# Patient Record
Sex: MALE | Race: WHITE | HISPANIC OR LATINO | Employment: FULL TIME | URBAN - METROPOLITAN AREA
[De-identification: names, ages, dates, MRNs, and addresses within clinical notes are randomized per-mention and may not be internally consistent; named-entity substitution may affect disease eponyms.]

---

## 2024-06-28 ENCOUNTER — NURSE TRIAGE (OUTPATIENT)
Dept: NURSING | Facility: CLINIC | Age: 24
End: 2024-06-28

## 2024-06-28 PROCEDURE — 99285 EMERGENCY DEPT VISIT HI MDM: CPT

## 2024-06-29 ENCOUNTER — HOSPITAL ENCOUNTER (INPATIENT)
Facility: CLINIC | Age: 24
LOS: 3 days | Discharge: HOME | End: 2024-07-03
Attending: EMERGENCY MEDICINE | Admitting: INTERNAL MEDICINE
Payer: COMMERCIAL

## 2024-06-29 ENCOUNTER — APPOINTMENT (OUTPATIENT)
Dept: CT IMAGING | Facility: CLINIC | Age: 24
End: 2024-06-29
Attending: EMERGENCY MEDICINE
Payer: COMMERCIAL

## 2024-06-29 DIAGNOSIS — K57.20 PERFORATED DIVERTICULUM OF LARGE INTESTINE: Primary | ICD-10-CM

## 2024-06-29 DIAGNOSIS — K57.92 DIVERTICULITIS: ICD-10-CM

## 2024-06-29 DIAGNOSIS — R30.0 DYSURIA: ICD-10-CM

## 2024-06-29 DIAGNOSIS — K59.00 CONSTIPATION, UNSPECIFIED CONSTIPATION TYPE: ICD-10-CM

## 2024-06-29 LAB
ALBUMIN SERPL BCG-MCNC: 3.7 G/DL (ref 3.5–5.2)
ALBUMIN UR-MCNC: NEGATIVE MG/DL
ALP SERPL-CCNC: 64 U/L (ref 40–150)
ALT SERPL W P-5'-P-CCNC: 22 U/L (ref 0–70)
ANION GAP SERPL CALCULATED.3IONS-SCNC: 7 MMOL/L (ref 7–15)
ANION GAP SERPL CALCULATED.3IONS-SCNC: 9 MMOL/L (ref 7–15)
APPEARANCE UR: CLEAR
AST SERPL W P-5'-P-CCNC: 17 U/L (ref 0–45)
BASOPHILS # BLD AUTO: 0 10E3/UL (ref 0–0.2)
BASOPHILS # BLD AUTO: 0 10E3/UL (ref 0–0.2)
BASOPHILS NFR BLD AUTO: 0 %
BASOPHILS NFR BLD AUTO: 0 %
BILIRUB SERPL-MCNC: 0.4 MG/DL
BILIRUB UR QL STRIP: NEGATIVE
BUN SERPL-MCNC: 12.4 MG/DL (ref 6–20)
BUN SERPL-MCNC: 9.6 MG/DL (ref 6–20)
C TRACH DNA SPEC QL NAA+PROBE: NEGATIVE
CALCIUM SERPL-MCNC: 8.5 MG/DL (ref 8.6–10)
CALCIUM SERPL-MCNC: 9.1 MG/DL (ref 8.6–10)
CHLORIDE SERPL-SCNC: 103 MMOL/L (ref 98–107)
CHLORIDE SERPL-SCNC: 105 MMOL/L (ref 98–107)
COLOR UR AUTO: ABNORMAL
CREAT SERPL-MCNC: 0.75 MG/DL (ref 0.67–1.17)
CREAT SERPL-MCNC: 0.91 MG/DL (ref 0.67–1.17)
DEPRECATED HCO3 PLAS-SCNC: 26 MMOL/L (ref 22–29)
DEPRECATED HCO3 PLAS-SCNC: 27 MMOL/L (ref 22–29)
EGFRCR SERPLBLD CKD-EPI 2021: >90 ML/MIN/1.73M2
EGFRCR SERPLBLD CKD-EPI 2021: >90 ML/MIN/1.73M2
EOSINOPHIL # BLD AUTO: 0.1 10E3/UL (ref 0–0.7)
EOSINOPHIL # BLD AUTO: 0.2 10E3/UL (ref 0–0.7)
EOSINOPHIL NFR BLD AUTO: 1 %
EOSINOPHIL NFR BLD AUTO: 1 %
ERYTHROCYTE [DISTWIDTH] IN BLOOD BY AUTOMATED COUNT: 13.6 % (ref 10–15)
ERYTHROCYTE [DISTWIDTH] IN BLOOD BY AUTOMATED COUNT: 13.8 % (ref 10–15)
GLUCOSE SERPL-MCNC: 102 MG/DL (ref 70–99)
GLUCOSE SERPL-MCNC: 107 MG/DL (ref 70–99)
GLUCOSE UR STRIP-MCNC: NEGATIVE MG/DL
HCT VFR BLD AUTO: 38.3 % (ref 40–53)
HCT VFR BLD AUTO: 40.4 % (ref 40–53)
HGB BLD-MCNC: 12.4 G/DL (ref 13.3–17.7)
HGB BLD-MCNC: 13 G/DL (ref 13.3–17.7)
HGB UR QL STRIP: ABNORMAL
IMM GRANULOCYTES # BLD: 0.1 10E3/UL
IMM GRANULOCYTES # BLD: 0.1 10E3/UL
IMM GRANULOCYTES NFR BLD: 0 %
IMM GRANULOCYTES NFR BLD: 1 %
KETONES UR STRIP-MCNC: NEGATIVE MG/DL
LACTATE SERPL-SCNC: 0.8 MMOL/L (ref 0.7–2)
LEUKOCYTE ESTERASE UR QL STRIP: NEGATIVE
LYMPHOCYTES # BLD AUTO: 1.8 10E3/UL (ref 0.8–5.3)
LYMPHOCYTES # BLD AUTO: 2.7 10E3/UL (ref 0.8–5.3)
LYMPHOCYTES NFR BLD AUTO: 15 %
LYMPHOCYTES NFR BLD AUTO: 19 %
MCH RBC QN AUTO: 28.7 PG (ref 26.5–33)
MCH RBC QN AUTO: 28.9 PG (ref 26.5–33)
MCHC RBC AUTO-ENTMCNC: 32.2 G/DL (ref 31.5–36.5)
MCHC RBC AUTO-ENTMCNC: 32.4 G/DL (ref 31.5–36.5)
MCV RBC AUTO: 89 FL (ref 78–100)
MCV RBC AUTO: 90 FL (ref 78–100)
MONOCYTES # BLD AUTO: 1.2 10E3/UL (ref 0–1.3)
MONOCYTES # BLD AUTO: 1.2 10E3/UL (ref 0–1.3)
MONOCYTES NFR BLD AUTO: 10 %
MONOCYTES NFR BLD AUTO: 9 %
MUCOUS THREADS #/AREA URNS LPF: PRESENT /LPF
N GONORRHOEA DNA SPEC QL NAA+PROBE: NEGATIVE
NEUTROPHILS # BLD AUTO: 8.4 10E3/UL (ref 1.6–8.3)
NEUTROPHILS # BLD AUTO: 9.7 10E3/UL (ref 1.6–8.3)
NEUTROPHILS NFR BLD AUTO: 70 %
NEUTROPHILS NFR BLD AUTO: 73 %
NITRATE UR QL: NEGATIVE
NRBC # BLD AUTO: 0 10E3/UL
NRBC # BLD AUTO: 0 10E3/UL
NRBC BLD AUTO-RTO: 0 /100
NRBC BLD AUTO-RTO: 0 /100
PH UR STRIP: 6 [PH] (ref 5–7)
PLATELET # BLD AUTO: 209 10E3/UL (ref 150–450)
PLATELET # BLD AUTO: 236 10E3/UL (ref 150–450)
POTASSIUM SERPL-SCNC: 3.8 MMOL/L (ref 3.4–5.3)
POTASSIUM SERPL-SCNC: 3.9 MMOL/L (ref 3.4–5.3)
PROT SERPL-MCNC: 7 G/DL (ref 6.4–8.3)
RADIOLOGIST FLAGS: ABNORMAL
RBC # BLD AUTO: 4.32 10E6/UL (ref 4.4–5.9)
RBC # BLD AUTO: 4.5 10E6/UL (ref 4.4–5.9)
RBC URINE: 1 /HPF
SODIUM SERPL-SCNC: 138 MMOL/L (ref 135–145)
SODIUM SERPL-SCNC: 139 MMOL/L (ref 135–145)
SP GR UR STRIP: 1.01 (ref 1–1.03)
UROBILINOGEN UR STRIP-MCNC: <2 MG/DL
WBC # BLD AUTO: 11.6 10E3/UL (ref 4–11)
WBC # BLD AUTO: 13.8 10E3/UL (ref 4–11)
WBC URINE: <1 /HPF

## 2024-06-29 PROCEDURE — 258N000003 HC RX IP 258 OP 636: Performed by: HOSPITALIST

## 2024-06-29 PROCEDURE — 250N000011 HC RX IP 250 OP 636: Performed by: HOSPITALIST

## 2024-06-29 PROCEDURE — 80048 BASIC METABOLIC PNL TOTAL CA: CPT | Performed by: EMERGENCY MEDICINE

## 2024-06-29 PROCEDURE — 250N000013 HC RX MED GY IP 250 OP 250 PS 637: Performed by: HOSPITALIST

## 2024-06-29 PROCEDURE — G0378 HOSPITAL OBSERVATION PER HR: HCPCS

## 2024-06-29 PROCEDURE — 85004 AUTOMATED DIFF WBC COUNT: CPT | Performed by: EMERGENCY MEDICINE

## 2024-06-29 PROCEDURE — 96367 TX/PROPH/DG ADDL SEQ IV INF: CPT

## 2024-06-29 PROCEDURE — 74177 CT ABD & PELVIS W/CONTRAST: CPT

## 2024-06-29 PROCEDURE — 99207 PR APP CREDIT; MD BILLING SHARED VISIT: CPT | Performed by: HOSPITALIST

## 2024-06-29 PROCEDURE — 96376 TX/PRO/DX INJ SAME DRUG ADON: CPT

## 2024-06-29 PROCEDURE — 87591 N.GONORRHOEAE DNA AMP PROB: CPT | Performed by: EMERGENCY MEDICINE

## 2024-06-29 PROCEDURE — 96360 HYDRATION IV INFUSION INIT: CPT | Mod: 59

## 2024-06-29 PROCEDURE — 96361 HYDRATE IV INFUSION ADD-ON: CPT

## 2024-06-29 PROCEDURE — 36415 COLL VENOUS BLD VENIPUNCTURE: CPT | Performed by: INTERNAL MEDICINE

## 2024-06-29 PROCEDURE — 36415 COLL VENOUS BLD VENIPUNCTURE: CPT | Performed by: EMERGENCY MEDICINE

## 2024-06-29 PROCEDURE — 96365 THER/PROPH/DIAG IV INF INIT: CPT

## 2024-06-29 PROCEDURE — 250N000013 HC RX MED GY IP 250 OP 250 PS 637: Performed by: INTERNAL MEDICINE

## 2024-06-29 PROCEDURE — 36415 COLL VENOUS BLD VENIPUNCTURE: CPT | Performed by: HOSPITALIST

## 2024-06-29 PROCEDURE — 87491 CHLMYD TRACH DNA AMP PROBE: CPT | Performed by: EMERGENCY MEDICINE

## 2024-06-29 PROCEDURE — 85025 COMPLETE CBC W/AUTO DIFF WBC: CPT | Performed by: INTERNAL MEDICINE

## 2024-06-29 PROCEDURE — 258N000003 HC RX IP 258 OP 636: Performed by: INTERNAL MEDICINE

## 2024-06-29 PROCEDURE — 83605 ASSAY OF LACTIC ACID: CPT | Performed by: HOSPITALIST

## 2024-06-29 PROCEDURE — 250N000011 HC RX IP 250 OP 636: Performed by: INTERNAL MEDICINE

## 2024-06-29 PROCEDURE — 99223 1ST HOSP IP/OBS HIGH 75: CPT | Performed by: INTERNAL MEDICINE

## 2024-06-29 PROCEDURE — 250N000011 HC RX IP 250 OP 636: Performed by: EMERGENCY MEDICINE

## 2024-06-29 PROCEDURE — 99204 OFFICE O/P NEW MOD 45 MIN: CPT | Performed by: SURGERY

## 2024-06-29 PROCEDURE — 81001 URINALYSIS AUTO W/SCOPE: CPT | Performed by: STUDENT IN AN ORGANIZED HEALTH CARE EDUCATION/TRAINING PROGRAM

## 2024-06-29 PROCEDURE — 258N000003 HC RX IP 258 OP 636: Performed by: EMERGENCY MEDICINE

## 2024-06-29 RX ORDER — NALOXONE HYDROCHLORIDE 0.4 MG/ML
0.4 INJECTION, SOLUTION INTRAMUSCULAR; INTRAVENOUS; SUBCUTANEOUS
Status: DISCONTINUED | OUTPATIENT
Start: 2024-06-29 | End: 2024-07-03 | Stop reason: HOSPADM

## 2024-06-29 RX ORDER — SODIUM CHLORIDE 9 MG/ML
INJECTION, SOLUTION INTRAVENOUS CONTINUOUS
Status: DISCONTINUED | OUTPATIENT
Start: 2024-06-29 | End: 2024-07-03 | Stop reason: HOSPADM

## 2024-06-29 RX ORDER — CIPROFLOXACIN 2 MG/ML
400 INJECTION, SOLUTION INTRAVENOUS EVERY 12 HOURS
Status: DISCONTINUED | OUTPATIENT
Start: 2024-06-29 | End: 2024-07-03

## 2024-06-29 RX ORDER — CIPROFLOXACIN 2 MG/ML
400 INJECTION, SOLUTION INTRAVENOUS ONCE
Status: COMPLETED | OUTPATIENT
Start: 2024-06-29 | End: 2024-06-29

## 2024-06-29 RX ORDER — HYDROCODONE BITARTRATE AND ACETAMINOPHEN 5; 325 MG/1; MG/1
1 TABLET ORAL EVERY 4 HOURS PRN
Status: DISCONTINUED | OUTPATIENT
Start: 2024-06-29 | End: 2024-07-03 | Stop reason: HOSPADM

## 2024-06-29 RX ORDER — ACETAMINOPHEN 650 MG/1
650 SUPPOSITORY RECTAL EVERY 4 HOURS PRN
Status: DISCONTINUED | OUTPATIENT
Start: 2024-06-29 | End: 2024-07-03 | Stop reason: HOSPADM

## 2024-06-29 RX ORDER — PHENAZOPYRIDINE HYDROCHLORIDE 200 MG/1
200 TABLET, FILM COATED ORAL 3 TIMES DAILY PRN
Qty: 9 TABLET | Refills: 0 | Status: SHIPPED | OUTPATIENT
Start: 2024-06-29 | End: 2024-07-02

## 2024-06-29 RX ORDER — HYDROCODONE BITARTRATE AND ACETAMINOPHEN 5; 325 MG/1; MG/1
2 TABLET ORAL EVERY 4 HOURS PRN
Status: DISCONTINUED | OUTPATIENT
Start: 2024-06-29 | End: 2024-07-03 | Stop reason: HOSPADM

## 2024-06-29 RX ORDER — METRONIDAZOLE 500 MG/100ML
500 INJECTION, SOLUTION INTRAVENOUS ONCE
Status: DISCONTINUED | OUTPATIENT
Start: 2024-06-29 | End: 2024-06-29

## 2024-06-29 RX ORDER — AMOXICILLIN 250 MG
1 CAPSULE ORAL 2 TIMES DAILY PRN
Status: DISCONTINUED | OUTPATIENT
Start: 2024-06-29 | End: 2024-07-03 | Stop reason: HOSPADM

## 2024-06-29 RX ORDER — IOPAMIDOL 755 MG/ML
90 INJECTION, SOLUTION INTRAVASCULAR ONCE
Status: COMPLETED | OUTPATIENT
Start: 2024-06-29 | End: 2024-06-29

## 2024-06-29 RX ORDER — DOCUSATE SODIUM 250 MG
250 CAPSULE ORAL 2 TIMES DAILY PRN
Qty: 28 CAPSULE | Refills: 0 | Status: SHIPPED | OUTPATIENT
Start: 2024-06-29 | End: 2024-07-13

## 2024-06-29 RX ORDER — NALOXONE HYDROCHLORIDE 0.4 MG/ML
0.2 INJECTION, SOLUTION INTRAMUSCULAR; INTRAVENOUS; SUBCUTANEOUS
Status: DISCONTINUED | OUTPATIENT
Start: 2024-06-29 | End: 2024-07-03 | Stop reason: HOSPADM

## 2024-06-29 RX ORDER — OMEGA-3 FATTY ACIDS/FISH OIL 300-1000MG
400-600 CAPSULE ORAL EVERY 8 HOURS PRN
COMMUNITY

## 2024-06-29 RX ORDER — ACETAMINOPHEN 325 MG/1
650 TABLET ORAL EVERY 4 HOURS PRN
Status: DISCONTINUED | OUTPATIENT
Start: 2024-06-29 | End: 2024-07-03 | Stop reason: HOSPADM

## 2024-06-29 RX ORDER — ONDANSETRON 4 MG/1
4 TABLET, ORALLY DISINTEGRATING ORAL EVERY 6 HOURS PRN
Status: DISCONTINUED | OUTPATIENT
Start: 2024-06-29 | End: 2024-07-03 | Stop reason: HOSPADM

## 2024-06-29 RX ORDER — METRONIDAZOLE 500 MG/100ML
500 INJECTION, SOLUTION INTRAVENOUS EVERY 12 HOURS
Status: DISCONTINUED | OUTPATIENT
Start: 2024-06-29 | End: 2024-07-03

## 2024-06-29 RX ORDER — ONDANSETRON 2 MG/ML
4 INJECTION INTRAMUSCULAR; INTRAVENOUS EVERY 6 HOURS PRN
Status: DISCONTINUED | OUTPATIENT
Start: 2024-06-29 | End: 2024-07-03 | Stop reason: HOSPADM

## 2024-06-29 RX ORDER — AMOXICILLIN 250 MG
2 CAPSULE ORAL 2 TIMES DAILY PRN
Status: DISCONTINUED | OUTPATIENT
Start: 2024-06-29 | End: 2024-07-03 | Stop reason: HOSPADM

## 2024-06-29 RX ADMIN — ACETAMINOPHEN 650 MG: 325 TABLET ORAL at 12:52

## 2024-06-29 RX ADMIN — SODIUM CHLORIDE: 9 INJECTION, SOLUTION INTRAVENOUS at 05:47

## 2024-06-29 RX ADMIN — HYDROCODONE BITARTRATE AND ACETAMINOPHEN 1 TABLET: 5; 325 TABLET ORAL at 20:05

## 2024-06-29 RX ADMIN — SODIUM CHLORIDE 500 ML: 9 INJECTION, SOLUTION INTRAVENOUS at 10:18

## 2024-06-29 RX ADMIN — ACETAMINOPHEN 650 MG: 325 TABLET ORAL at 19:58

## 2024-06-29 RX ADMIN — METRONIDAZOLE 500 MG: 500 INJECTION, SOLUTION INTRAVENOUS at 04:36

## 2024-06-29 RX ADMIN — CIPROFLOXACIN 400 MG: 400 INJECTION, SOLUTION INTRAVENOUS at 03:17

## 2024-06-29 RX ADMIN — SODIUM CHLORIDE: 9 INJECTION, SOLUTION INTRAVENOUS at 17:16

## 2024-06-29 RX ADMIN — ACETAMINOPHEN 650 MG: 325 TABLET ORAL at 04:39

## 2024-06-29 RX ADMIN — IOPAMIDOL 90 ML: 755 INJECTION, SOLUTION INTRAVENOUS at 02:37

## 2024-06-29 RX ADMIN — METRONIDAZOLE 500 MG: 500 INJECTION, SOLUTION INTRAVENOUS at 17:08

## 2024-06-29 RX ADMIN — SODIUM CHLORIDE 1000 ML: 9 INJECTION, SOLUTION INTRAVENOUS at 01:09

## 2024-06-29 ASSESSMENT — ACTIVITIES OF DAILY LIVING (ADL)
ADLS_ACUITY_SCORE: 36
ADLS_ACUITY_SCORE: 20
ADLS_ACUITY_SCORE: 20
ADLS_ACUITY_SCORE: 35
ADLS_ACUITY_SCORE: 36
ADLS_ACUITY_SCORE: 36
ADLS_ACUITY_SCORE: 33
ADLS_ACUITY_SCORE: 35
ADLS_ACUITY_SCORE: 36
ADLS_ACUITY_SCORE: 20
ADLS_ACUITY_SCORE: 36
ADLS_ACUITY_SCORE: 36
ADLS_ACUITY_SCORE: 35
ADLS_ACUITY_SCORE: 36

## 2024-06-29 NOTE — H&P
[facility] MEDICINE ADMISSION HISTORY AND PHYSICAL       Assessment & Plan      This is a 24 year old white male with perforated diverticulitis       Present on Admission (POA)    1. Acute perforated diverticulitis -- CT showed no additional disseminated free intraperitoneal gas, or pericolonic abscess. Abdomen not peritoneal on exams. No family history of diverticulitis    - IVF, NPO, anti emetics, PRN pain meds for mod/severe  - Cipro and flagyl to continue  - Gen surgery referral   - GI ref -  outpatient follow up for colonoscopy      VTE prophylaxis --  SCDs, if appropriate, add SQH  Diet --  NPO  Code Status -- Full  Barriers to discharge -- Admitting/Acute medical condition/s  Discharge Disposition and goals --  Unable to determine at this point, pending progress/treatment response.     PPE - I was wearing PPE including but not limited to - N95 mask, Gloves, and/or Safety glasses.  Admission Status --     Care plan is based on available information and patient's condition at encounter. Care plan was discussed and agreed to proceed by the patient/family member. Made aware that care plan may change.     I recommend to review/revise history/care plan for information/results not available during my encounter. All or some home medication/s were not resumed or was modified on admission due to safety concerns. Will have rounding AM doc  review safety to resume held/modified home medications.     Availability -- If need to coordinate care after night shift  -- Contact assigned AM rounding Hospitalist.     75 minutes spent by me on the date of service doing chart review, history, exam, diagnostic test results interpretation, care coordination with RN, RT and/or Pharm, & further activities per the note.      William Alvares MD, MPH, FACP, [facility]  Internal Medicine - Hospitalist        Chief Complaint Left sided abdominal pain      HISTORY     - Patient was seen in ED - 6. Met his  too.  -  For 2 days, left sided abdominal pain, bladder pain with fever and chills. No diarrhea. No vomiting. No urinary symptoms. No CP or SOB.     - ED course/treatments/referral - CT scan -- Pronounced pericolonic fat stranding at the descending colon / sigmoid colon junction in the region of what appears to be an inflamed diverticulum. Small focus of free intraperitoneal gas in this region. Findings consistent with acute perforated  diverticulitis. No additional disseminated free intraperitoneal gas, or pericolonic abscess.    - Was given Flagyl and Cipro. Gen surgery consult.     - ROS --- No headache. No dizziness. No weakness. No CP or SOB. No palpitations.  No nausea or vomiting. No urinary symptoms. No bleeding symptoms. No weight loss. Rest of 12 point ROS was reviewed and negative.       Past Medical History     No past medical history on file.      Surgical History     No past surgical history on file.     Family History      No family history on file.      Social History      .  Social History     Socioeconomic History    Marital status:      Spouse name: Not on file    Number of children: Not on file    Years of education: Not on file    Highest education level: Not on file   Occupational History    Not on file   Tobacco Use    Smoking status: Not on file    Smokeless tobacco: Not on file   Substance and Sexual Activity    Alcohol use: Not on file    Drug use: Not on file    Sexual activity: Not on file   Other Topics Concern    Not on file   Social History Narrative    Not on file     Social Determinants of Health     Financial Resource Strain: Not on file   Food Insecurity: Not on file   Transportation Needs: Not on file   Physical Activity: Not on file   Stress: Not on file   Social Connections: Not on file   Interpersonal Safety: Not on file   Housing Stability: Not on file          Allergies      No Known Allergies      Prior to Admission Medications      Current Facility-Administered Medications    Medication Dose Route Frequency Provider Last Rate Last Admin    ciprofloxacin (CIPRO) infusion 400 mg  400 mg Intravenous Once Vicky Garvey MD        metroNIDAZOLE (FLAGYL) infusion 500 mg  500 mg Intravenous Once Vicky Garvey MD         Current Outpatient Medications   Medication Sig Dispense Refill    docusate sodium (COLACE) 250 MG capsule Take 1 capsule (250 mg) by mouth 2 times daily as needed for constipation 28 capsule 0    phenazopyridine (PYRIDIUM) 200 MG tablet Take 1 tablet (200 mg) by mouth 3 times daily as needed for irritation 9 tablet 0           Review of Systems     A 12 point comprehensive review of systems was negative except as noted above in HPI.    PHYSICAL EXAMINATION       Vitals      Vitals: /83   Pulse 117   Temp 97.4  F (36.3  C) (Temporal)   Resp 18   SpO2 95%   BMI= There is no height or weight on file to calculate BMI.      Examination     General Appearance:  Alert, cooperative, no distress  Head:    Normocephalic, without obvious abnormality, atraumatic  EENT:  PERRL, conjunctiva/corneas clear, EOM's intact.   Neck:   Supple, symmetrical, trachea midline, no adenopathy; no NVE  Back:  Symmetric, no curvature, no CVA tenderness  Chest/Lungs: Clear to auscultation bilaterally, respirations unlabored, No tenderness or deformity. No abdominal breathing or use of accessory muscles.   Heart:    Regular rate and rhythm, S1 and S2 normal, no murmur, rub   or gallop  Abdomen: Soft - left sided abdominal pain (+) NABS, not peritoneal on palpation. Not distended  Extremities:  Extremities normal, atraumatic, no swelling   Skin:  Skin color, texture, turgor normal, no rashes or lesion  Neurologic:  Awake and alert, No lateralizing or localizing signs         Pertinent Lab     Results for orders placed or performed during the hospital encounter of 06/29/24   CT Abdomen Pelvis w Contrast   Result Value Ref Range    Radiologist flags (AA)      Perforated viscus  (acute perforated diverticulitis)    Impression    IMPRESSION:   1.  Pronounced pericolonic fat stranding at the descending colon / sigmoid colon junction in the region of what appears to be an inflamed diverticulum. Small focus of free intraperitoneal gas in this region. Findings consistent with acute perforated   diverticulitis. No additional disseminated free intraperitoneal gas, or pericolonic abscess.  2.  Marked diffuse hepatic steatosis.      [Critical Result: Perforated viscus (acute perforated diverticulitis)]    Finding was identified on 6/29/2024 2:46 AM CDT.     Dr. Vicky Eldridge was contacted by me on 6/29/2024 2:52 AM CDT and verbalized understanding of the critical result.    UA with Microscopic reflex to Culture    Specimen: Urine, Midstream   Result Value Ref Range    Color Urine Light Yellow Colorless, Straw, Light Yellow, Yellow    Appearance Urine Clear Clear    Glucose Urine Negative Negative mg/dL    Bilirubin Urine Negative Negative    Ketones Urine Negative Negative mg/dL    Specific Gravity Urine 1.015 1.001 - 1.030    Blood Urine 0.06 mg/dL (A) Negative    pH Urine 6.0 5.0 - 7.0    Protein Albumin Urine Negative Negative mg/dL    Urobilinogen Urine <2.0 <2.0 mg/dL    Nitrite Urine Negative Negative    Leukocyte Esterase Urine Negative Negative    Mucus Urine Present (A) None Seen /LPF    RBC Urine 1 <=2 /HPF    WBC Urine <1 <=5 /HPF   Basic metabolic panel   Result Value Ref Range    Sodium 139 135 - 145 mmol/L    Potassium 3.9 3.4 - 5.3 mmol/L    Chloride 103 98 - 107 mmol/L    Carbon Dioxide (CO2) 27 22 - 29 mmol/L    Anion Gap 9 7 - 15 mmol/L    Urea Nitrogen 12.4 6.0 - 20.0 mg/dL    Creatinine 0.91 0.67 - 1.17 mg/dL    GFR Estimate >90 >60 mL/min/1.73m2    Calcium 9.1 8.6 - 10.0 mg/dL    Glucose 102 (H) 70 - 99 mg/dL   CBC with platelets and differential   Result Value Ref Range    WBC Count 13.8 (H) 4.0 - 11.0 10e3/uL    RBC Count 4.50 4.40 - 5.90 10e6/uL    Hemoglobin 13.0 (L) 13.3  - 17.7 g/dL    Hematocrit 40.4 40.0 - 53.0 %    MCV 90 78 - 100 fL    MCH 28.9 26.5 - 33.0 pg    MCHC 32.2 31.5 - 36.5 g/dL    RDW 13.8 10.0 - 15.0 %    Platelet Count 236 150 - 450 10e3/uL    % Neutrophils 70 %    % Lymphocytes 19 %    % Monocytes 9 %    % Eosinophils 1 %    % Basophils 0 %    % Immature Granulocytes 0 %    NRBCs per 100 WBC 0 <1 /100    Absolute Neutrophils 9.7 (H) 1.6 - 8.3 10e3/uL    Absolute Lymphocytes 2.7 0.8 - 5.3 10e3/uL    Absolute Monocytes 1.2 0.0 - 1.3 10e3/uL    Absolute Eosinophils 0.1 0.0 - 0.7 10e3/uL    Absolute Basophils 0.0 0.0 - 0.2 10e3/uL    Absolute Immature Granulocytes 0.1 <=0.4 10e3/uL    Absolute NRBCs 0.0 10e3/uL           Pertinent Radiology

## 2024-06-29 NOTE — TELEPHONE ENCOUNTER
Nurse Triage SBAR    Is this a 2nd Level Triage? NO    Situation:  Bladder pain    Background: Reports symptoms have been going on for the last 3 days.     Assessment:  Reports bladder pain. States it is somewhat difficult to void and is painful. Reporting he feels his bladder seems distended and tender to the touch. Reports he has chills and thinks he has a fever. Also reports being fatigued and lightheaded.     Protocol Recommended Disposition:   Go to ED Now    Recommendation:  ED now. Protocol and care advice reviewed. Advised to call back with any new or worsening signs, symptoms, concerns, or questions. They verbalized understanding and agreed to follow advice given.     Reason for Disposition   [1] Unable to urinate (or only a few drops) > 4 hours AND [2] bladder feels very full (e.g., feels blocked with strong urge to urinate; palpable bladder)    Additional Information   Negative: Shock suspected (e.g., cold/pale/clammy skin, too weak to stand, low BP, rapid pulse)   Negative: Sounds like a life-threatening emergency to the triager    Protocols used: Urination Pain - Male-A-REE Hampton RN on 6/28/2024 at 10:59 PM

## 2024-06-29 NOTE — PROGRESS NOTES
[facility]    Medicine Progress Note - Hospitalist Service    Date of Admission:  6/29/2024    Assessment & Plan   23 y/o M presented with abdominal pain and found to have acute perforated diverticulitis. General surgery consulted. IV antibiotics and conservative medical cares pending initial surgery evaluation.     Acute perforated diverticulitis -- CT showed no additional disseminated free intraperitoneal gas, or pericolonic abscess. Abdomen not peritoneal on exams. No family history of diverticulitis     - IVF, NPO, anti emetics, PRN pain meds for mod/severe  - IV Cipro and flagyl to continue  - Gen surgery referral   - Pain and nausea control       Observation Goals:   Diet: NPO for Medical/Clinical Reasons Except for: Ice Chips, Meds    DVT Prophylaxis: Low Risk/Ambulatory with no VTE prophylaxis indicated, Pneumatic Compression Devices, Anti-embolisim stockings (TEDs), Ambulate every shift, and VTE Prophylaxis contraindicated due to pending initial surgery evaluation and risk for needing invasive procedure   Raza Catheter: Not present  Lines: None     Cardiac Monitoring: None  Code Status: Full Code      Clinically Significant Risk Factors Present on Admission                                         Disposition Plan     Medically Ready for Discharge: Anticipated in 2-4 Days             Jesus Todd MD  Hospitalist Service  [facility]  Securely message with [employer] (more info)  Text page via [employer] Paging/Directory   ______________________________________________________________________    Interval History   No acute events overnight.    No report of chest pain, shortness of breath, or other new complaints. Pain is currently tolerable. Discussed plan of care with patient. Answered all questions to patient's verbalized understanding and satisfaction.    Physical Exam   Vital Signs: Temp: 98.3  F (36.8  C) Temp src: Oral BP: 109/54 Pulse: 87   Resp: 18 SpO2:  99 % O2 Device: None (Room air)    Weight: 0 lbs 0 oz    GENERAL:  Alert, appears comfortable, in no acute distress, appears stated age, on ambient room air   HEAD:  Normocephalic, without obvious abnormality, atraumatic   EYES:  Conjunctiva/corneas clear, no scleral icterus, EOM's appears intact grossly   NOSE: Nares normal, septum midline, mucosa normal, no drainage   THROAT: Lips, mucosa, and tongue appear normal   NECK: Symmetrical, trachea appears midline   BACK:   Symmetric, no curvature noted   LUNGS:   Symmetric chest rise on inhalation, respirations unlabored   CHEST WALL:  No apparent deformity   HEART:  No thrills or heaves visualized   ABDOMEN:   Soft, benign, no rigidity, no rebound, no masses or organomegaly apparent; non-scaphoid   EXTREMITIES: Extremities appear normal, atraumatic, no cyanosis   SKIN: No exanthems in the visualized areas   NEURO: Alert and oriented x4, moves all four extremities freely and spontaneously   PSYCH: Cooperative, behavior is appropriate       Medical Decision Making       Post-midnight so not billed to patient      Data     I have personally reviewed the following data over the past 24 hrs:    11.6 (H)  \   12.4 (L)   / 209     138 105 9.6 /  107 (H)   3.8 26 0.75 \     ALT: 22 AST: 17 AP: 64 TBILI: 0.4   ALB: 3.7 TOT PROTEIN: 7.0 LIPASE: N/A     Procal: N/A CRP: N/A Lactic Acid: 0.8         Imaging results reviewed over the past 24 hrs:   Recent Results (from the past 24 hour(s))   CT Abdomen Pelvis w Contrast   Result Value    Radiologist flags (AA)     Perforated viscus (acute perforated diverticulitis)    Narrative    EXAM: CT ABDOMEN PELVIS W CONTRAST  LOCATION: [facility]  DATE: 6/29/2024    INDICATION: lower abdominal pain  COMPARISON: None.  TECHNIQUE: CT scan of the abdomen and pelvis was performed following injection of IV contrast. Multiplanar reformats were obtained. Dose reduction techniques were used.  CONTRAST: 90 ML ISOVUE  370    FINDINGS:   LOWER CHEST: Normal.    HEPATOBILIARY: Marked diffuse hepatic steatosis. Unremarkable gallbladder.    PANCREAS: Normal.    SPLEEN: Normal.    ADRENAL GLANDS: Normal.    KIDNEYS/BLADDER: Normal.    BOWEL: Pronounced pericolonic fat stranding at the descending colon / sigmoid colon junction in the region of what appears to be an inflamed diverticulum. Small focus of free intraperitoneal gas in this region. Findings consistent with acute perforated   diverticulitis. No additional disseminated free intraperitoneal gas, or pericolonic abscess. Moderate colonic stool. Normal appendix. No bowel obstruction.    LYMPH NODES: Normal.    VASCULATURE: Normal.    PELVIC ORGANS: Normal.    MUSCULOSKELETAL: Multilevel degenerative changes of the thoracic and lumbosacral spine. No acute osseous abnormality.      Impression    IMPRESSION:   1.  Pronounced pericolonic fat stranding at the descending colon / sigmoid colon junction in the region of what appears to be an inflamed diverticulum. Small focus of free intraperitoneal gas in this region. Findings consistent with acute perforated   diverticulitis. No additional disseminated free intraperitoneal gas, or pericolonic abscess.  2.  Marked diffuse hepatic steatosis.      [Critical Result: Perforated viscus (acute perforated diverticulitis)]    Finding was identified on 6/29/2024 2:46 AM CDT.     Dr. Vicky Eldridge was contacted by me on 6/29/2024 2:52 AM CDT and verbalized understanding of the critical result.

## 2024-06-29 NOTE — ED PROVIDER NOTES
EMERGENCY DEPARTMENT ENCOUNTER      NAME: Edmund Vieira  AGE: 24 year old male  YOB: 2000  MRN: [id number]  EVALUATION DATE & TIME: No admission date for patient encounter.    PCP: No Ref-Primary, Physician    ED PROVIDER: Vicky Garvey MD      Chief Complaint   Patient presents with    Dysuria         FINAL IMPRESSION:  1. Dysuria    2. Constipation, unspecified constipation type    3. Diverticulitis          ED COURSE & MEDICAL DECISION MAKING:    Pertinent Labs & Imaging studies reviewed. (See chart for details)  24 year old male presents to the Emergency Department for evaluation of dysuria.  He states that he has noticed suprapubic discomfort over the last 2 days.  This evening, he developed constitutional symptoms.  He has also been constipated over the last 5 days.  Initial urinalysis with no signs of overt infection.  Patient with a leukocytosis.  Given unclear etiology of his symptoms, CT scan of the abdomen pelvis was obtained demonstrating diverticulitis with microperforation.  No drainable abscess, a small focus of intraperitoneal gas was identified.  I spoke with general surgery who would recommend admission for IV antibiotics, clear good diet.  Patient was admitted to the hospitalist.    At the conclusion of the encounter I discussed the results of all of the tests and the disposition. The questions were answered. The patient or family acknowledged understanding and was agreeable with the care plan.     Additional ED course Timeline:  12:41 AM I met with the patient, obtained history, performed an initial exam, and discussed options and plan for diagnostics and treatment here in the ED.   1:58 AM I checked on the patient.  3:00 AM I updated the patient on findings and consult to general surgery  3:01 AM Dr. Jimenez, general surgery, would recommend admission for IV antibiotics, clear liquid diet.  3:02 AM I updated the patient on plan for admission for IV antibiotics  3:14 AM   Giorgio hospitalist, accepts the patient for admission.      Medical Decision Making  Obtained supplemental history:Supplemental history obtained?: No  Reviewed external records: External records reviewed?: Documented in chart  Care impacted by chronic illness:N/A  Care significantly affected by social determinants of health:Access to Medical Care  Did you consider but not order tests?: Work up considered but not performed and documented in chart, if applicable  Did you interpret images independently?: Independent interpretation of ECG and images noted in documentation, when applicable.  Consultation discussion with other provider:Did you involve another provider (consultant, , pharmacy, etc.)?: I discussed the care with another health care provider, see documentation for details.  Admit.      MEDICATIONS GIVEN IN THE EMERGENCY:  Medications   metroNIDAZOLE (FLAGYL) infusion 500 mg (has no administration in time range)   ciprofloxacin (CIPRO) infusion 400 mg (400 mg Intravenous $New Bag 6/29/24 8588)   senna-docusate (SENOKOT-S/PERICOLACE) 8.6-50 MG per tablet 1 tablet (has no administration in time range)     Or   senna-docusate (SENOKOT-S/PERICOLACE) 8.6-50 MG per tablet 2 tablet (has no administration in time range)   ondansetron (ZOFRAN ODT) ODT tab 4 mg (has no administration in time range)     Or   ondansetron (ZOFRAN) injection 4 mg (has no administration in time range)   sodium chloride 0.9 % infusion (has no administration in time range)   acetaminophen (TYLENOL) tablet 650 mg (has no administration in time range)     Or   acetaminophen (TYLENOL) Suppository 650 mg (has no administration in time range)   melatonin tablet 5 mg (has no administration in time range)   ciprofloxacin (CIPRO) infusion 400 mg (has no administration in time range)   HYDROcodone-acetaminophen (NORCO) 5-325 MG per tablet 1 tablet (has no administration in time range)   HYDROcodone-acetaminophen (NORCO) 5-325 MG per tablet 2  tablet (has no administration in time range)   sodium chloride 0.9% BOLUS 1,000 mL (0 mLs Intravenous Stopped 6/29/24 0245)   iopamidol (ISOVUE-370) solution 90 mL (90 mLs Intravenous $Given 6/29/24 0237)       NEW PRESCRIPTIONS STARTED AT TODAY'S ER VISIT  New Prescriptions    DOCUSATE SODIUM (COLACE) 250 MG CAPSULE    Take 1 capsule (250 mg) by mouth 2 times daily as needed for constipation    PHENAZOPYRIDINE (PYRIDIUM) 200 MG TABLET    Take 1 tablet (200 mg) by mouth 3 times daily as needed for irritation          =================================================================    HPI    Patient information was obtained from: The patient    Use of : N/A         Edmund Vieira is a 24 year old male with no pertinent history who presents to this ED via walk-in for evaluation of dysuria.    The patient is complaining of suprapubic discomfort for the past 2 days. He has burning with urination and difficulty to initiate stream. He developed tactile fever, chills, and sweat this evening. No blood in urine or abnormal penile discharge. He has been constipated for the past 5.5 days. He had a small bowel movement this morning. No history of similar urinary symptoms.      PAST MEDICAL HISTORY:  No past medical history on file.    PAST SURGICAL HISTORY:  No past surgical history on file.        CURRENT MEDICATIONS:    docusate sodium (COLACE) 250 MG capsule  phenazopyridine (PYRIDIUM) 200 MG tablet        ALLERGIES:  No Known Allergies    FAMILY HISTORY:  No family history on file.    SOCIAL HISTORY:   Social History     Socioeconomic History    Marital status:        VITALS:  /59   Pulse 86   Temp 97.4  F (36.3  C) (Temporal)   Resp 18   SpO2 98%     PHYSICAL EXAM    Constitutional: Well developed, Well nourished, NAD  HENT: Normocephalic, Atraumatic, Bilateral external ears normal, Oropharynx normal, mucous membranes moist, Nose normal.   Neck- Normal range of motion, No tenderness, Supple,  No stridor.  Eyes: PERRL, EOMI, Conjunctiva normal, No discharge.   Respiratory: Normal breath sounds, No respiratory distress  Cardiovascular: Normal heart rate, Regular rhythm  GI: Bowel sounds normal, Soft, Mild suprapubic tenderness to palpation.  Musculoskeletal: No edema. Good range of motion in all major joints. No tenderness to palpation or major deformities noted.   Integument: Warm, Dry, No erythema, No rash  Neurologic: Alert & oriented x 3, Normal motor function, Normal sensory function, No focal deficits noted. Normal gait.   Psychiatric: Affect normal, Judgment normal, Mood normal.      LAB:  All pertinent labs reviewed and interpreted.  Results for orders placed or performed during the hospital encounter of 06/29/24   CT Abdomen Pelvis w Contrast   Result Value Ref Range    Radiologist flags (AA)      Perforated viscus (acute perforated diverticulitis)    Impression    IMPRESSION:   1.  Pronounced pericolonic fat stranding at the descending colon / sigmoid colon junction in the region of what appears to be an inflamed diverticulum. Small focus of free intraperitoneal gas in this region. Findings consistent with acute perforated   diverticulitis. No additional disseminated free intraperitoneal gas, or pericolonic abscess.  2.  Marked diffuse hepatic steatosis.      [Critical Result: Perforated viscus (acute perforated diverticulitis)]    Finding was identified on 6/29/2024 2:46 AM CDT.     Dr. Vicky Eldridge was contacted by me on 6/29/2024 2:52 AM CDT and verbalized understanding of the critical result.    UA with Microscopic reflex to Culture    Specimen: Urine, Midstream   Result Value Ref Range    Color Urine Light Yellow Colorless, Straw, Light Yellow, Yellow    Appearance Urine Clear Clear    Glucose Urine Negative Negative mg/dL    Bilirubin Urine Negative Negative    Ketones Urine Negative Negative mg/dL    Specific Gravity Urine 1.015 1.001 - 1.030    Blood Urine 0.06 mg/dL (A) Negative     pH Urine 6.0 5.0 - 7.0    Protein Albumin Urine Negative Negative mg/dL    Urobilinogen Urine <2.0 <2.0 mg/dL    Nitrite Urine Negative Negative    Leukocyte Esterase Urine Negative Negative    Mucus Urine Present (A) None Seen /LPF    RBC Urine 1 <=2 /HPF    WBC Urine <1 <=5 /HPF   Basic metabolic panel   Result Value Ref Range    Sodium 139 135 - 145 mmol/L    Potassium 3.9 3.4 - 5.3 mmol/L    Chloride 103 98 - 107 mmol/L    Carbon Dioxide (CO2) 27 22 - 29 mmol/L    Anion Gap 9 7 - 15 mmol/L    Urea Nitrogen 12.4 6.0 - 20.0 mg/dL    Creatinine 0.91 0.67 - 1.17 mg/dL    GFR Estimate >90 >60 mL/min/1.73m2    Calcium 9.1 8.6 - 10.0 mg/dL    Glucose 102 (H) 70 - 99 mg/dL   CBC with platelets and differential   Result Value Ref Range    WBC Count 13.8 (H) 4.0 - 11.0 10e3/uL    RBC Count 4.50 4.40 - 5.90 10e6/uL    Hemoglobin 13.0 (L) 13.3 - 17.7 g/dL    Hematocrit 40.4 40.0 - 53.0 %    MCV 90 78 - 100 fL    MCH 28.9 26.5 - 33.0 pg    MCHC 32.2 31.5 - 36.5 g/dL    RDW 13.8 10.0 - 15.0 %    Platelet Count 236 150 - 450 10e3/uL    % Neutrophils 70 %    % Lymphocytes 19 %    % Monocytes 9 %    % Eosinophils 1 %    % Basophils 0 %    % Immature Granulocytes 0 %    NRBCs per 100 WBC 0 <1 /100    Absolute Neutrophils 9.7 (H) 1.6 - 8.3 10e3/uL    Absolute Lymphocytes 2.7 0.8 - 5.3 10e3/uL    Absolute Monocytes 1.2 0.0 - 1.3 10e3/uL    Absolute Eosinophils 0.1 0.0 - 0.7 10e3/uL    Absolute Basophils 0.0 0.0 - 0.2 10e3/uL    Absolute Immature Granulocytes 0.1 <=0.4 10e3/uL    Absolute NRBCs 0.0 10e3/uL       RADIOLOGY:  Reviewed all pertinent imaging. Please see official radiology report.  CT Abdomen Pelvis w Contrast   Final Result   Abnormal   IMPRESSION:    1.  Pronounced pericolonic fat stranding at the descending colon / sigmoid colon junction in the region of what appears to be an inflamed diverticulum. Small focus of free intraperitoneal gas in this region. Findings consistent with acute perforated    diverticulitis. No  additional disseminated free intraperitoneal gas, or pericolonic abscess.   2.  Marked diffuse hepatic steatosis.         [Critical Result: Perforated viscus (acute perforated diverticulitis)]      Finding was identified on 6/29/2024 2:46 AM CDT.       Dr. Vicky Eldridge was contacted by me on 6/29/2024 2:52 AM CDT and verbalized understanding of the critical result.             I, Reynold Amaro, am serving as a scribe to document services personally performed by Vicky Garvey, based on my observation and the provider's statements to me. I, Vicky Garvey MD, attest that Reynold Amaro is acting in a scribe capacity, has observed my performance of the services and has documented them in accordance with my direction.    Vicky Garvey MD  Emergency Medicine  [facility] EMERGENCY ROOM  [address] [id number]  [phone]       Vicky Garvey MD  06/29/24 0342

## 2024-06-29 NOTE — PROGRESS NOTES
"PRIMARY DIAGNOSIS: \"GENERIC\" NURSING  OUTPATIENT/OBSERVATION GOALS TO BE MET BEFORE DISCHARGE:  ADLs back to baseline: Yes    Activity and level of assistance: Up with standby assistance.    Pain status: Improved-controlled with oral pain medications.    Return to near baseline physical activity: Yes     A&O. Resting in bed during shift. Continues with bowel rest and IV antibiotics. NPO meds/ice. Pain managed with tylenol.   "

## 2024-06-29 NOTE — ED NOTES
Pt reporting pain 5/10, notes this is tolerable.  Denies pain medication at this time.  Pt resting on stretcher, s/o remains at bedside.  VSS.

## 2024-06-29 NOTE — ED TRIAGE NOTES
Pt reporting bladder pain and spasms. Having dysuria over the last few days.      Triage Assessment (Adult)       Row Name 06/28/24 2828          Triage Assessment    Airway WDL WDL        Respiratory WDL    Respiratory WDL WDL        Skin Circulation/Temperature WDL    Skin Circulation/Temperature WDL WDL        Cardiac WDL    Cardiac WDL WDL        Peripheral/Neurovascular WDL    Peripheral Neurovascular WDL WDL        Cognitive/Neuro/Behavioral WDL    Cognitive/Neuro/Behavioral WDL WDL

## 2024-06-29 NOTE — PROGRESS NOTES
PRIMARY DIAGNOSIS: ACUTE PAIN  OUTPATIENT/OBSERVATION GOALS TO BE MET BEFORE DISCHARGE:  1. Pain Status: Improved-controlled with oral pain medications.    2. Return to near baseline physical activity: Yes    3. Cleared for discharge by consultants (if involved): No. General surgery consulted.     A&Ox4. VSS. Up SBA to bathroom. Frequesncy with urination. Urinating Verbalized pain to LLQ. Bowel sounds hypoactive. Passing gas. Denies nausea. NPO. Fluids running.

## 2024-06-29 NOTE — CONSULTS
General surgery consult         ASSESSMENT     1. Dysuria    2. Constipation, unspecified constipation type    3. Diverticulitis       24-year-old with a contained diverticular perforation      PLAN      Conservative management with IV antibiotics and bowel rest         CHIEF COMPLAINT     Chief Complaint   Patient presents with    Dysuria         HPI     Edmund Vieira is a 24 year old male who presents to [facility] emergency department with 2 days of left-sided abdominal pain.  He also describes some fevers and chills.  They deny diarrhea he denies vomiting he had no urinary symptoms no shortness of breath and no chest pain.  CT scan was performed and it shows an area at the descending to sigmoid colon junction with some inflammation and a bit of surrounding free air.         PAST MEDICAL HISTORY SURGICAL HISTORY     No past medical history on file. No past surgical history on file.       CURRENT MEDICATIONS ALLERGIES     Current Outpatient Rx   Medication Sig Dispense Refill    docusate sodium (COLACE) 250 MG capsule Take 1 capsule (250 mg) by mouth 2 times daily as needed for constipation 28 capsule 0    ibuprofen (ADVIL/MOTRIN) 200 MG capsule Take 400-600 mg by mouth every 8 hours as needed for moderate pain      phenazopyridine (PYRIDIUM) 200 MG tablet Take 1 tablet (200 mg) by mouth 3 times daily as needed for irritation 9 tablet 0    Allergies   Allergen Reactions    Adhesive Tape Hives          FAMILY HISTORY   No family history on file.      SOCIAL HISTORY     Social History     Socioeconomic History    Marital status:          REVIEW OF SYSTEMS       12 point review of systems are unremarkable except for the symptoms described in the HPI    PHYSICAL EXAM     VITAL SIGNS: /57 (BP Location: Right arm)   Pulse 98   Temp (!) 100.6  F (38.1  C) (Oral)   Resp 18   SpO2 97%    General : Alert, cooperative, appears stated age   Skin: Skin color, texture, turgor normal, no rashes or lesions    Lymph nodes: No obvious adenopathy   Head: Normocephalic, without obvious abnormality, atraumatic   HEENT:  conjunctiva/corneas clear, EOM's intact, no scleral icterus   Throat: Lips, mucosa, and tongue normal; teeth and gums normal    Neck: Supple, thyroid not enlarged   Back: No CVA tenderness   Lungs: Clear to auscultation bilaterally, respirations unlabored, no rales, rhonchi or wheezes   Chest Wall:No tenderness or deformity   Heart: Regular rate and rhythm, S1, S2 normal, no murmur, rub or gallop   Abdomen: Soft, non-tender, no guarding, + bowel sounds active,   Extremities : No obvious swelling,  Neurologic: Cranial Nerves II-XII normal, moves all extremities equally, no focal findings          RADIOLOGY      CT Abdomen Pelvis w Contrast   Final Result   Abnormal   IMPRESSION:    1.  Pronounced pericolonic fat stranding at the descending colon / sigmoid colon junction in the region of what appears to be an inflamed diverticulum. Small focus of free intraperitoneal gas in this region. Findings consistent with acute perforated    diverticulitis. No additional disseminated free intraperitoneal gas, or pericolonic abscess.   2.  Marked diffuse hepatic steatosis.         [Critical Result: Perforated viscus (acute perforated diverticulitis)]      Finding was identified on 6/29/2024 2:46 AM CDT.       Dr. Vicky Eldridge was contacted by me on 6/29/2024 2:52 AM CDT and verbalized understanding of the critical result.           EKG     Reviewed        LABS     Results for orders placed or performed during the hospital encounter of 06/29/24   CT Abdomen Pelvis w Contrast   Result Value Ref Range    Radiologist flags (AA)      Perforated viscus (acute perforated diverticulitis)    Impression    IMPRESSION:   1.  Pronounced pericolonic fat stranding at the descending colon / sigmoid colon junction in the region of what appears to be an inflamed diverticulum. Small focus of free intraperitoneal gas in this region.  Findings consistent with acute perforated   diverticulitis. No additional disseminated free intraperitoneal gas, or pericolonic abscess.  2.  Marked diffuse hepatic steatosis.      [Critical Result: Perforated viscus (acute perforated diverticulitis)]    Finding was identified on 6/29/2024 2:46 AM CDT.     Dr. Vicky Eldridge was contacted by me on 6/29/2024 2:52 AM CDT and verbalized understanding of the critical result.    UA with Microscopic reflex to Culture    Specimen: Urine, Midstream   Result Value Ref Range    Color Urine Light Yellow Colorless, Straw, Light Yellow, Yellow    Appearance Urine Clear Clear    Glucose Urine Negative Negative mg/dL    Bilirubin Urine Negative Negative    Ketones Urine Negative Negative mg/dL    Specific Gravity Urine 1.015 1.001 - 1.030    Blood Urine 0.06 mg/dL (A) Negative    pH Urine 6.0 5.0 - 7.0    Protein Albumin Urine Negative Negative mg/dL    Urobilinogen Urine <2.0 <2.0 mg/dL    Nitrite Urine Negative Negative    Leukocyte Esterase Urine Negative Negative    Mucus Urine Present (A) None Seen /LPF    RBC Urine 1 <=2 /HPF    WBC Urine <1 <=5 /HPF   Basic metabolic panel   Result Value Ref Range    Sodium 139 135 - 145 mmol/L    Potassium 3.9 3.4 - 5.3 mmol/L    Chloride 103 98 - 107 mmol/L    Carbon Dioxide (CO2) 27 22 - 29 mmol/L    Anion Gap 9 7 - 15 mmol/L    Urea Nitrogen 12.4 6.0 - 20.0 mg/dL    Creatinine 0.91 0.67 - 1.17 mg/dL    GFR Estimate >90 >60 mL/min/1.73m2    Calcium 9.1 8.6 - 10.0 mg/dL    Glucose 102 (H) 70 - 99 mg/dL   CBC with platelets and differential   Result Value Ref Range    WBC Count 13.8 (H) 4.0 - 11.0 10e3/uL    RBC Count 4.50 4.40 - 5.90 10e6/uL    Hemoglobin 13.0 (L) 13.3 - 17.7 g/dL    Hematocrit 40.4 40.0 - 53.0 %    MCV 90 78 - 100 fL    MCH 28.9 26.5 - 33.0 pg    MCHC 32.2 31.5 - 36.5 g/dL    RDW 13.8 10.0 - 15.0 %    Platelet Count 236 150 - 450 10e3/uL    % Neutrophils 70 %    % Lymphocytes 19 %    % Monocytes 9 %    % Eosinophils 1  %    % Basophils 0 %    % Immature Granulocytes 0 %    NRBCs per 100 WBC 0 <1 /100    Absolute Neutrophils 9.7 (H) 1.6 - 8.3 10e3/uL    Absolute Lymphocytes 2.7 0.8 - 5.3 10e3/uL    Absolute Monocytes 1.2 0.0 - 1.3 10e3/uL    Absolute Eosinophils 0.1 0.0 - 0.7 10e3/uL    Absolute Basophils 0.0 0.0 - 0.2 10e3/uL    Absolute Immature Granulocytes 0.1 <=0.4 10e3/uL    Absolute NRBCs 0.0 10e3/uL   Comprehensive metabolic panel   Result Value Ref Range    Sodium 138 135 - 145 mmol/L    Potassium 3.8 3.4 - 5.3 mmol/L    Carbon Dioxide (CO2) 26 22 - 29 mmol/L    Anion Gap 7 7 - 15 mmol/L    Urea Nitrogen 9.6 6.0 - 20.0 mg/dL    Creatinine 0.75 0.67 - 1.17 mg/dL    GFR Estimate >90 >60 mL/min/1.73m2    Calcium 8.5 (L) 8.6 - 10.0 mg/dL    Chloride 105 98 - 107 mmol/L    Glucose 107 (H) 70 - 99 mg/dL    Alkaline Phosphatase 64 40 - 150 U/L    AST 17 0 - 45 U/L    ALT 22 0 - 70 U/L    Protein Total 7.0 6.4 - 8.3 g/dL    Albumin 3.7 3.5 - 5.2 g/dL    Bilirubin Total 0.4 <=1.2 mg/dL   CBC with platelets and differential   Result Value Ref Range    WBC Count 11.6 (H) 4.0 - 11.0 10e3/uL    RBC Count 4.32 (L) 4.40 - 5.90 10e6/uL    Hemoglobin 12.4 (L) 13.3 - 17.7 g/dL    Hematocrit 38.3 (L) 40.0 - 53.0 %    MCV 89 78 - 100 fL    MCH 28.7 26.5 - 33.0 pg    MCHC 32.4 31.5 - 36.5 g/dL    RDW 13.6 10.0 - 15.0 %    Platelet Count 209 150 - 450 10e3/uL    % Neutrophils 73 %    % Lymphocytes 15 %    % Monocytes 10 %    % Eosinophils 1 %    % Basophils 0 %    % Immature Granulocytes 1 %    NRBCs per 100 WBC 0 <1 /100    Absolute Neutrophils 8.4 (H) 1.6 - 8.3 10e3/uL    Absolute Lymphocytes 1.8 0.8 - 5.3 10e3/uL    Absolute Monocytes 1.2 0.0 - 1.3 10e3/uL    Absolute Eosinophils 0.2 0.0 - 0.7 10e3/uL    Absolute Basophils 0.0 0.0 - 0.2 10e3/uL    Absolute Immature Granulocytes 0.1 <=0.4 10e3/uL    Absolute NRBCs 0.0 10e3/uL           [facility]  [phone]

## 2024-06-29 NOTE — PHARMACY-ADMISSION MEDICATION HISTORY
Pharmacist Admission Medication History    Admission medication history is complete. The information provided in this note is only as accurate as the sources available at the time of the update.    Information Source(s): Patient and CareEverywhere/SureScripts via in-person    Pertinent Information:  Docusate and phenazopyridine are new prescriptions    Changes made to PTA medication list:  Added: Advil  Deleted: None  Changed: None    Allergies reviewed with patient and updates made in EHR: yes    Medication History Completed By: Lexis Chandler [facility] 6/29/2024 7:42 AM    PTA Med List   Medication Sig Last Dose    docusate sodium (COLACE) 250 MG capsule Take 1 capsule (250 mg) by mouth 2 times daily as needed for constipation     ibuprofen (ADVIL/MOTRIN) 200 MG capsule Take 400-600 mg by mouth every 8 hours as needed for moderate pain Past Week    phenazopyridine (PYRIDIUM) 200 MG tablet Take 1 tablet (200 mg) by mouth 3 times daily as needed for irritation

## 2024-06-30 PROBLEM — K57.20 PERFORATED DIVERTICULUM OF LARGE INTESTINE: Status: ACTIVE | Noted: 2024-06-30

## 2024-06-30 LAB
ERYTHROCYTE [DISTWIDTH] IN BLOOD BY AUTOMATED COUNT: 13.3 % (ref 10–15)
HCT VFR BLD AUTO: 38 % (ref 40–53)
HGB BLD-MCNC: 12.2 G/DL (ref 13.3–17.7)
MCH RBC QN AUTO: 28.8 PG (ref 26.5–33)
MCHC RBC AUTO-ENTMCNC: 32.1 G/DL (ref 31.5–36.5)
MCV RBC AUTO: 90 FL (ref 78–100)
PLATELET # BLD AUTO: 196 10E3/UL (ref 150–450)
RBC # BLD AUTO: 4.23 10E6/UL (ref 4.4–5.9)
WBC # BLD AUTO: 11.1 10E3/UL (ref 4–11)

## 2024-06-30 PROCEDURE — 36415 COLL VENOUS BLD VENIPUNCTURE: CPT | Performed by: NURSE PRACTITIONER

## 2024-06-30 PROCEDURE — G0378 HOSPITAL OBSERVATION PER HR: HCPCS

## 2024-06-30 PROCEDURE — 99232 SBSQ HOSP IP/OBS MODERATE 35: CPT | Performed by: NURSE PRACTITIONER

## 2024-06-30 PROCEDURE — 258N000003 HC RX IP 258 OP 636: Performed by: INTERNAL MEDICINE

## 2024-06-30 PROCEDURE — 96376 TX/PRO/DX INJ SAME DRUG ADON: CPT

## 2024-06-30 PROCEDURE — 250N000011 HC RX IP 250 OP 636: Performed by: HOSPITALIST

## 2024-06-30 PROCEDURE — 99232 SBSQ HOSP IP/OBS MODERATE 35: CPT | Performed by: HOSPITALIST

## 2024-06-30 PROCEDURE — 258N000003 HC RX IP 258 OP 636: Performed by: HOSPITALIST

## 2024-06-30 PROCEDURE — 85027 COMPLETE CBC AUTOMATED: CPT | Performed by: NURSE PRACTITIONER

## 2024-06-30 PROCEDURE — 120N000001 HC R&B MED SURG/OB

## 2024-06-30 RX ADMIN — SODIUM CHLORIDE: 9 INJECTION, SOLUTION INTRAVENOUS at 03:35

## 2024-06-30 RX ADMIN — CIPROFLOXACIN 400 MG: 400 INJECTION, SOLUTION INTRAVENOUS at 14:25

## 2024-06-30 RX ADMIN — SODIUM CHLORIDE: 9 INJECTION, SOLUTION INTRAVENOUS at 23:52

## 2024-06-30 RX ADMIN — METRONIDAZOLE 500 MG: 500 INJECTION, SOLUTION INTRAVENOUS at 03:35

## 2024-06-30 RX ADMIN — SODIUM CHLORIDE: 9 INJECTION, SOLUTION INTRAVENOUS at 13:25

## 2024-06-30 RX ADMIN — CIPROFLOXACIN 400 MG: 400 INJECTION, SOLUTION INTRAVENOUS at 03:35

## 2024-06-30 RX ADMIN — METRONIDAZOLE 500 MG: 500 INJECTION, SOLUTION INTRAVENOUS at 16:12

## 2024-06-30 ASSESSMENT — ACTIVITIES OF DAILY LIVING (ADL)
ADLS_ACUITY_SCORE: 20

## 2024-06-30 NOTE — PROGRESS NOTES
PRIMARY DIAGNOSIS: Perf Diverticulitis    OUTPATIENT/OBSERVATION GOALS TO BE MET BEFORE DISCHARGE  1. Orthostatic performed: N/A    2. Tolerating PO fluid and/or antibiotics (if applicable):  Pt is currently NPO. Tolerating IV abx.    3. Nausea/Vomiting/Diarrhea symptoms improved: yes.    4. Pain status: Improved-controlled with oral pain medications.    5. Return to near baseline physical activity: Yes    Discharge Planner Nurse   Safe discharge environment identified: Yes  Barriers to discharge: Yes. Clinical improvement.

## 2024-06-30 NOTE — PLAN OF CARE
"PRIMARY DIAGNOSIS: \"GENERIC\" NURSING  OUTPATIENT/OBSERVATION GOALS TO BE MET BEFORE DISCHARGE:  ADLs back to baseline: Yes    Activity and level of assistance: Ambulating independently.    Pain status: Pain free.    Return to near baseline physical activity: Yes     Discharge Planner Nurse   Safe discharge environment identified: Yes  Barriers to discharge: Yes       Entered by: Soni Rock RN 06/30/2024 10:05 AM     Please review provider order for any additional goals.   Nurse to notify provider when observation goals have been met and patient is ready for discharge.      "

## 2024-06-30 NOTE — PROGRESS NOTES
"General Surgery Progress Note:    Hospital Day # 0    ASSESSMENT:   1. Dysuria    2. Constipation, unspecified constipation type    3. Diverticulitis        Edmund Vieira is a 24 year old male presents with perforated diverticulitis. Patient is improving but remains very tender on physical exam. We will give him another day of bowel rest.    PLAN:   NPO except ice chips and meds; if tenderness improves tomorrow, can start clear liquids  Continue abx  Surgery will continue to follow    SUBJECTIVE:   Edmund Vieira is feeling better and passing flatus. No nausea. Passing flatus. No fever or chills    Patient Vitals for the past 24 hrs:   BP Temp Temp src Pulse Resp SpO2 Height Weight   06/30/24 1300 132/71 98.4  F (36.9  C) Oral 96 22 96 % -- --   06/30/24 0834 120/65 98  F (36.7  C) Oral 90 16 94 % -- --   06/30/24 0339 131/60 97.5  F (36.4  C) Oral 92 16 95 % 1.753 m (5' 9\") (!) 164.2 kg (362 lb 1.6 oz)   06/29/24 2300 113/58 98  F (36.7  C) Oral 92 18 97 % -- --   06/29/24 1955 106/64 100.2  F (37.9  C) Oral 104 18 97 % -- --   06/29/24 1616 126/61 99.2  F (37.3  C) Oral 100 18 97 % -- --       Physical Exam:  General: NAD, pleasant  CV:RRR  LUNGS:CTA bilaterally  ABD: soft, obese, exquisite tenderness in the LLQ  EXT:no CCE    Admission on 06/29/2024   Component Date Value    Color Urine 06/29/2024 Light Yellow     Appearance Urine 06/29/2024 Clear     Glucose Urine 06/29/2024 Negative     Bilirubin Urine 06/29/2024 Negative     Ketones Urine 06/29/2024 Negative     Specific Gravity Urine 06/29/2024 1.015     Blood Urine 06/29/2024 0.06 mg/dL (A)     pH Urine 06/29/2024 6.0     Protein Albumin Urine 06/29/2024 Negative     Urobilinogen Urine 06/29/2024 <2.0     Nitrite Urine 06/29/2024 Negative     Leukocyte Esterase Urine 06/29/2024 Negative     Mucus Urine 06/29/2024 Present (A)     RBC Urine 06/29/2024 1     WBC Urine 06/29/2024 <1     Chlamydia trachomatis 06/29/2024 Negative     Neisseria gonorrhoeae " 06/29/2024 Negative     Sodium 06/29/2024 139     Potassium 06/29/2024 3.9     Chloride 06/29/2024 103     Carbon Dioxide (CO2) 06/29/2024 27     Anion Gap 06/29/2024 9     Urea Nitrogen 06/29/2024 12.4     Creatinine 06/29/2024 0.91     GFR Estimate 06/29/2024 >90     Calcium 06/29/2024 9.1     Glucose 06/29/2024 102 (H)     WBC Count 06/29/2024 13.8 (H)     RBC Count 06/29/2024 4.50     Hemoglobin 06/29/2024 13.0 (L)     Hematocrit 06/29/2024 40.4     MCV 06/29/2024 90     MCH 06/29/2024 28.9     MCHC 06/29/2024 32.2     RDW 06/29/2024 13.8     Platelet Count 06/29/2024 236     % Neutrophils 06/29/2024 70     % Lymphocytes 06/29/2024 19     % Monocytes 06/29/2024 9     % Eosinophils 06/29/2024 1     % Basophils 06/29/2024 0     % Immature Granulocytes 06/29/2024 0     NRBCs per 100 WBC 06/29/2024 0     Absolute Neutrophils 06/29/2024 9.7 (H)     Absolute Lymphocytes 06/29/2024 2.7     Absolute Monocytes 06/29/2024 1.2     Absolute Eosinophils 06/29/2024 0.1     Absolute Basophils 06/29/2024 0.0     Absolute Immature Granul* 06/29/2024 0.1     Absolute NRBCs 06/29/2024 0.0     Radiologist flags 06/29/2024 Perforated viscus (acute perforated diverticulitis) (AA)     Sodium 06/29/2024 138     Potassium 06/29/2024 3.8     Carbon Dioxide (CO2) 06/29/2024 26     Anion Gap 06/29/2024 7     Urea Nitrogen 06/29/2024 9.6     Creatinine 06/29/2024 0.75     GFR Estimate 06/29/2024 >90     Calcium 06/29/2024 8.5 (L)     Chloride 06/29/2024 105     Glucose 06/29/2024 107 (H)     Alkaline Phosphatase 06/29/2024 64     AST 06/29/2024 17     ALT 06/29/2024 22     Protein Total 06/29/2024 7.0     Albumin 06/29/2024 3.7     Bilirubin Total 06/29/2024 0.4     WBC Count 06/29/2024 11.6 (H)     RBC Count 06/29/2024 4.32 (L)     Hemoglobin 06/29/2024 12.4 (L)     Hematocrit 06/29/2024 38.3 (L)     MCV 06/29/2024 89     MCH 06/29/2024 28.7     MCHC 06/29/2024 32.4     RDW 06/29/2024 13.6     Platelet Count 06/29/2024 209     %  Neutrophils 06/29/2024 73     % Lymphocytes 06/29/2024 15     % Monocytes 06/29/2024 10     % Eosinophils 06/29/2024 1     % Basophils 06/29/2024 0     % Immature Granulocytes 06/29/2024 1     NRBCs per 100 WBC 06/29/2024 0     Absolute Neutrophils 06/29/2024 8.4 (H)     Absolute Lymphocytes 06/29/2024 1.8     Absolute Monocytes 06/29/2024 1.2     Absolute Eosinophils 06/29/2024 0.2     Absolute Basophils 06/29/2024 0.0     Absolute Immature Granul* 06/29/2024 0.1     Absolute NRBCs 06/29/2024 0.0     Lactic Acid, Initial 06/29/2024 0.8     WBC Count 06/30/2024 11.1 (H)     RBC Count 06/30/2024 4.23 (L)     Hemoglobin 06/30/2024 12.2 (L)     Hematocrit 06/30/2024 38.0 (L)     MCV 06/30/2024 90     MCH 06/30/2024 28.8     MCHC 06/30/2024 32.1     RDW 06/30/2024 13.3     Platelet Count 06/30/2024 Eusebio Lema, LUCIAN CNP

## 2024-06-30 NOTE — PROGRESS NOTES
"PRIMARY DIAGNOSIS: \"GENERIC\" NURSING  OUTPATIENT/OBSERVATION GOALS TO BE MET BEFORE DISCHARGE:  ADLs back to baseline: Yes    Activity and level of assistance: Ambulating independently.    Pain status: Pain free.    Return to near baseline physical activity: Yes     Discharge Planner Nurse   Safe discharge environment identified: Yes  Barriers to discharge: Yes       Entered by: HERNESTO RUBY RN 06/30/2024 2:01 AM     Please review provider order for any additional goals.   Nurse to notify provider when observation goals have been met and patient is ready for discharge.  "

## 2024-06-30 NOTE — UTILIZATION REVIEW
Admission Status; Secondary Review Determination   Under the authority of the Utilization Management Committee, the utilization review process indicated a secondary review on Edmund Vieira. The review outcome is based on review of the medical records, discussions with staff, and applying clinical experience noted on the date of the review.   (x) Inpatient Status Appropriate - This patient's medical care is consistent with medical management for inpatient care and reasonable inpatient medical practice.     RATIONALE FOR DETERMINATION   23 y/o M presented with abdominal pain and found to have acute perforated diverticulitis. General surgery consulted. IV antibiotics.  Continues NPO with IV abx.    At the time of admission with the information available to the attending physician more than 2 nights Hospital complex care was anticipated, based on patient risk of adverse outcome if treated as outpatient and complex care required. Inpatient admission is appropriate based on the Medicare guidelines.   The information on this document is developed by the utilization review team in order for the business office to ensure compliance. This only denotes the appropriateness of proper admission status and does not reflect the quality of care rendered.   The definitions of Inpatient Status and Observation Status used in making the determination above are those provided in the CMS Coverage Manual, Chapter 1 and Chapter 6, section 70.4.   Sincerely,   Micki Everett MD  Utilization Review  Physician Advisor  [facility]

## 2024-06-30 NOTE — PLAN OF CARE
Problem: Adult Inpatient Plan of Care  Goal: Absence of Hospital-Acquired Illness or Injury  Intervention: Identify and Manage Fall Risk  Recent Flowsheet Documentation  Taken 6/30/2024 1600 by Simin Boykin RN  Safety Promotion/Fall Prevention:   nonskid shoes/slippers when out of bed   patient and family education     Problem: Adult Inpatient Plan of Care  Goal: Absence of Hospital-Acquired Illness or Injury  Intervention: Prevent Skin Injury  Recent Flowsheet Documentation  Taken 6/30/2024 1600 by Simin Boykin RN  Body Position: position changed independently  Skin Protection: adhesive use limited  Device Skin Pressure Protection: adhesive use limited   Goal Outcome Evaluation:      Plan of Care Reviewed With: patient    Overall Patient Progress: improvingOverall Patient Progress: improving  Pt alert and oriented, vss on room air can voice needs, fully independent, NPO continues on iv abx and iv fluids. Denies pain.

## 2024-06-30 NOTE — PROGRESS NOTES
"PRIMARY DIAGNOSIS: \"GENERIC\" NURSING  OUTPATIENT/OBSERVATION GOALS TO BE MET BEFORE DISCHARGE:  ADLs back to baseline: Yes    Activity and level of assistance: Ambulating independently.    Pain status: Pain free.    Return to near baseline physical activity: Yes     Discharge Planner Nurse   Safe discharge environment identified: Yes  Barriers to discharge: Yes       Entered by: HERNESTO RUBY RN 06/30/2024 5:44 AM     Please review provider order for any additional goals.   Nurse to notify provider when observation goals have been met and patient is ready for discharge.  "

## 2024-06-30 NOTE — PROGRESS NOTES
"PRIMARY DIAGNOSIS: \"GENERIC\" NURSING  OUTPATIENT/OBSERVATION GOALS TO BE MET BEFORE DISCHARGE:  ADLs back to baseline: Yes     Activity and level of assistance: Ambulating independently.     Pain status: Pain free.     Return to near baseline physical activity: Yes             Discharge Planner Nurse  Safe discharge environment identified: Yes  Barriers to discharge: Yes       Entered by: Soni Rock RN 06/30/2024 2:41 PM        Please review provider order for any additional goals.   Nurse to notify provider when observation goals have been met and patient is ready for discharge.    Spoke with Dr. Jimenez regarding current diet orders @ 1450, to remain on bowel rest NPO with ice chips/sips of water okay, updated pt of the plan.  "

## 2024-06-30 NOTE — PROGRESS NOTES
"[facility]    Medicine Progress Note - Hospitalist Service    Date of Admission:  6/29/2024    Assessment & Plan   25 y/o M presented with abdominal pain and found to have acute perforated diverticulitis. General surgery consulted. IV antibiotics and conservative medical cares pending initial surgery evaluation.     Acute perforated diverticulitis -- CT showed no additional disseminated free intraperitoneal gas, or pericolonic abscess. Abdomen not peritoneal on exams. No family history of diverticulitis     - IVF, NPO, anti emetics, PRN pain meds for mod/severe  - IV Cipro and flagyl to continue  - Gen surgery referral   - Pain and nausea control  - NPO until general surgery clears to restart diet - then clears and very slowly advance as tolerated.        Observation Goals:   Diet: NPO for Medical/Clinical Reasons Except for: Ice Chips, Meds    DVT Prophylaxis: Low Risk/Ambulatory with no VTE prophylaxis indicated, Pneumatic Compression Devices, Anti-embolisim stockings (TEDs), Ambulate every shift, and VTE Prophylaxis contraindicated due to pending initial surgery evaluation and risk for needing invasive procedure   Raza Catheter: Not present  Lines: None     Cardiac Monitoring: None  Code Status: Full Code      Clinically Significant Risk Factors Present on Admission                              # Severe Obesity: Estimated body mass index is 53.47 kg/m  as calculated from the following:    Height as of this encounter: 1.753 m (5' 9\").    Weight as of this encounter: 164.2 kg (362 lb 1.6 oz).              Disposition Plan     Medically Ready for Discharge: Anticipated in 2-4 Days             Jesus Todd MD  Hospitalist Service  [facility]  Securely message with [employer] (more info)  Text page via [employer] Paging/Directory   ______________________________________________________________________    Interval History   No acute events overnight.    Improved abdominal " pain. Appetite somewhat returned last night, not hungry now this AM but agreeable to restarting diet when cleared to do so by surgery. No further N/V. No report of chest pain, shortness of breath, or other new complaints. Discussed plan of care with patient. Answered all questions to patient's verbalized understanding and satisfaction.      Physical Exam   Vital Signs: Temp: 97.5  F (36.4  C) Temp src: Oral BP: 131/60 Pulse: 92   Resp: 16 SpO2: 95 % O2 Device: None (Room air)    Weight: 362 lbs 1.6 oz    GENERAL:  Alert, appears comfortable, in no acute distress, appears stated age, on ambient room air   HEAD:  Normocephalic, without obvious abnormality, atraumatic   EYES:  Conjunctiva/corneas clear, no scleral icterus, EOM's appears intact grossly   NOSE: Nares normal, septum midline, mucosa normal, no drainage   THROAT: Lips, mucosa, and tongue appear normal   NECK: Symmetrical, trachea appears midline   BACK:   Symmetric, no curvature noted   LUNGS:   Symmetric chest rise on inhalation, respirations unlabored   CHEST WALL:  No apparent deformity   HEART:  No thrills or heaves visualized   ABDOMEN:   Soft, benign, no rigidity, no rebound, no masses or organomegaly apparent; non-scaphoid   EXTREMITIES: Extremities appear normal, atraumatic, no cyanosis   SKIN: No exanthems in the visualized areas   NEURO: Alert and oriented x4, moves all four extremities freely and spontaneously   PSYCH: Cooperative, behavior is appropriate       Medical Decision Making   36 MINUTES SPENT BY ME on the date of service doing chart review, history, exam, documentation & further activities per the note.        Data     I have personally reviewed the following data over the past 24 hrs:    Procal: N/A CRP: N/A Lactic Acid: 0.8         Imaging results reviewed over the past 24 hrs:   No results found for this or any previous visit (from the past 24 hour(s)).

## 2024-07-01 ENCOUNTER — HOME INFUSION (PRE-WILLOW HOME INFUSION) (OUTPATIENT)
Dept: PHARMACY | Facility: CLINIC | Age: 24
End: 2024-07-01
Payer: COMMERCIAL

## 2024-07-01 LAB
ERYTHROCYTE [DISTWIDTH] IN BLOOD BY AUTOMATED COUNT: 13.3 % (ref 10–15)
HCT VFR BLD AUTO: 37.8 % (ref 40–53)
HGB BLD-MCNC: 12.3 G/DL (ref 13.3–17.7)
HOLD SPECIMEN: NORMAL
MCH RBC QN AUTO: 29.1 PG (ref 26.5–33)
MCHC RBC AUTO-ENTMCNC: 32.5 G/DL (ref 31.5–36.5)
MCV RBC AUTO: 90 FL (ref 78–100)
PLATELET # BLD AUTO: 208 10E3/UL (ref 150–450)
RBC # BLD AUTO: 4.22 10E6/UL (ref 4.4–5.9)
WBC # BLD AUTO: 7.7 10E3/UL (ref 4–11)

## 2024-07-01 PROCEDURE — 250N000011 HC RX IP 250 OP 636: Performed by: HOSPITALIST

## 2024-07-01 PROCEDURE — 120N000001 HC R&B MED SURG/OB

## 2024-07-01 PROCEDURE — 99232 SBSQ HOSP IP/OBS MODERATE 35: CPT | Performed by: HOSPITALIST

## 2024-07-01 PROCEDURE — 258N000003 HC RX IP 258 OP 636: Performed by: HOSPITALIST

## 2024-07-01 PROCEDURE — 99232 SBSQ HOSP IP/OBS MODERATE 35: CPT

## 2024-07-01 PROCEDURE — 36415 COLL VENOUS BLD VENIPUNCTURE: CPT

## 2024-07-01 PROCEDURE — 85027 COMPLETE CBC AUTOMATED: CPT

## 2024-07-01 RX ADMIN — CIPROFLOXACIN 400 MG: 400 INJECTION, SOLUTION INTRAVENOUS at 14:08

## 2024-07-01 RX ADMIN — SODIUM CHLORIDE: 9 INJECTION, SOLUTION INTRAVENOUS at 20:41

## 2024-07-01 RX ADMIN — METRONIDAZOLE 500 MG: 500 INJECTION, SOLUTION INTRAVENOUS at 15:32

## 2024-07-01 RX ADMIN — CIPROFLOXACIN 400 MG: 400 INJECTION, SOLUTION INTRAVENOUS at 03:37

## 2024-07-01 RX ADMIN — METRONIDAZOLE 500 MG: 500 INJECTION, SOLUTION INTRAVENOUS at 03:37

## 2024-07-01 ASSESSMENT — ACTIVITIES OF DAILY LIVING (ADL)
ADLS_ACUITY_SCORE: 20

## 2024-07-01 NOTE — PLAN OF CARE
I attest to Florence Alvarado, Student Nurse Intern  Lexis Herrera, RN    Pt is A&O, VSS, and CMS intact. Independent in room. Voiding and tolerating clear liquid diet. Pt took shower this AM. Antibiotics administered. Call light appropriate, is tri to make needs known, and is resting between cares.   Florence Alvarado, Student Nurse Intern       Problem: Adult Inpatient Plan of Care  Goal: Optimal Comfort and Wellbeing  Outcome: Progressing     Problem: Pain Acute  Goal: Optimal Pain Control and Function  Outcome: Progressing  Intervention: Prevent or Manage Pain  Recent Flowsheet Documentation  Taken 7/1/2024 0800 by Florence Alvarado  Medication Review/Management: medications reviewed

## 2024-07-01 NOTE — PROGRESS NOTES
General Surgery Progress Note:    Hospital Day # 1    ASSESSMENT:   1. Dysuria    2. Constipation, unspecified constipation type    3. Diverticulitis      Edmund Vieira is a 24 year old male who presents with perforated diverticulitis. Afebrile, no tachycardia, and normal WBC today. Patient improving with mild LLQ tenderness on exam. Conservative management with IV antibiotics and bowel rest. Advance to clear liquids.    PLAN:   - Clear liquid diet  - Continue IV antibiotics  - Medical management per primary team  - General surgery will continue to follow    SUBJECTIVE:   Edmund Vieira is doing better today. Reports minimal abdominal pain. Feeling ready to have more of a diet. Passing flatus and having liquid brown stools. Voiding with no issues. Denies nausea, vomiting, fever, chills.    Patient Vitals for the past 24 hrs:   BP Temp Temp src Pulse Resp SpO2   07/01/24 0730 135/60 98.4  F (36.9  C) Oral 82 16 94 %   07/01/24 0307 135/66 98.3  F (36.8  C) Oral 93 16 94 %   06/30/24 2354 (!) 141/75 98.1  F (36.7  C) Oral 89 16 95 %   06/30/24 1905 (!) 142/99 98.4  F (36.9  C) Oral 99 16 96 %   06/30/24 1539 138/78 98.1  F (36.7  C) Oral 94 16 96 %   06/30/24 1300 132/71 98.4  F (36.9  C) Oral 96 22 96 %   06/30/24 0834 120/65 98  F (36.7  C) Oral 90 16 94 %     Physical Exam:  General: patient seen resting in bed, no acute distress  Resp: no respiratory distress, breathing comfortably on RA  Abdomen: soft, obese, mild LLQ tenderness to palpitation, no rebound tenderness or guarding  Extremities: warm and well perfused    Admission on 06/29/2024   Component Date Value    Color Urine 06/29/2024 Light Yellow     Appearance Urine 06/29/2024 Clear     Glucose Urine 06/29/2024 Negative     Bilirubin Urine 06/29/2024 Negative     Ketones Urine 06/29/2024 Negative     Specific Gravity Urine 06/29/2024 1.015     Blood Urine 06/29/2024 0.06 mg/dL (A)     pH Urine 06/29/2024 6.0     Protein Albumin Urine 06/29/2024 Negative      Urobilinogen Urine 06/29/2024 <2.0     Nitrite Urine 06/29/2024 Negative     Leukocyte Esterase Urine 06/29/2024 Negative     Mucus Urine 06/29/2024 Present (A)     RBC Urine 06/29/2024 1     WBC Urine 06/29/2024 <1     Chlamydia trachomatis 06/29/2024 Negative     Neisseria gonorrhoeae 06/29/2024 Negative     Sodium 06/29/2024 139     Potassium 06/29/2024 3.9     Chloride 06/29/2024 103     Carbon Dioxide (CO2) 06/29/2024 27     Anion Gap 06/29/2024 9     Urea Nitrogen 06/29/2024 12.4     Creatinine 06/29/2024 0.91     GFR Estimate 06/29/2024 >90     Calcium 06/29/2024 9.1     Glucose 06/29/2024 102 (H)     WBC Count 06/29/2024 13.8 (H)     RBC Count 06/29/2024 4.50     Hemoglobin 06/29/2024 13.0 (L)     Hematocrit 06/29/2024 40.4     MCV 06/29/2024 90     MCH 06/29/2024 28.9     MCHC 06/29/2024 32.2     RDW 06/29/2024 13.8     Platelet Count 06/29/2024 236     % Neutrophils 06/29/2024 70     % Lymphocytes 06/29/2024 19     % Monocytes 06/29/2024 9     % Eosinophils 06/29/2024 1     % Basophils 06/29/2024 0     % Immature Granulocytes 06/29/2024 0     NRBCs per 100 WBC 06/29/2024 0     Absolute Neutrophils 06/29/2024 9.7 (H)     Absolute Lymphocytes 06/29/2024 2.7     Absolute Monocytes 06/29/2024 1.2     Absolute Eosinophils 06/29/2024 0.1     Absolute Basophils 06/29/2024 0.0     Absolute Immature Granul* 06/29/2024 0.1     Absolute NRBCs 06/29/2024 0.0     Radiologist flags 06/29/2024 Perforated viscus (acute perforated diverticulitis) (AA)     Sodium 06/29/2024 138     Potassium 06/29/2024 3.8     Carbon Dioxide (CO2) 06/29/2024 26     Anion Gap 06/29/2024 7     Urea Nitrogen 06/29/2024 9.6     Creatinine 06/29/2024 0.75     GFR Estimate 06/29/2024 >90     Calcium 06/29/2024 8.5 (L)     Chloride 06/29/2024 105     Glucose 06/29/2024 107 (H)     Alkaline Phosphatase 06/29/2024 64     AST 06/29/2024 17     ALT 06/29/2024 22     Protein Total 06/29/2024 7.0     Albumin 06/29/2024 3.7     Bilirubin Total  06/29/2024 0.4     WBC Count 06/29/2024 11.6 (H)     RBC Count 06/29/2024 4.32 (L)     Hemoglobin 06/29/2024 12.4 (L)     Hematocrit 06/29/2024 38.3 (L)     MCV 06/29/2024 89     MCH 06/29/2024 28.7     MCHC 06/29/2024 32.4     RDW 06/29/2024 13.6     Platelet Count 06/29/2024 209     % Neutrophils 06/29/2024 73     % Lymphocytes 06/29/2024 15     % Monocytes 06/29/2024 10     % Eosinophils 06/29/2024 1     % Basophils 06/29/2024 0     % Immature Granulocytes 06/29/2024 1     NRBCs per 100 WBC 06/29/2024 0     Absolute Neutrophils 06/29/2024 8.4 (H)     Absolute Lymphocytes 06/29/2024 1.8     Absolute Monocytes 06/29/2024 1.2     Absolute Eosinophils 06/29/2024 0.2     Absolute Basophils 06/29/2024 0.0     Absolute Immature Granul* 06/29/2024 0.1     Absolute NRBCs 06/29/2024 0.0     Lactic Acid, Initial 06/29/2024 0.8     WBC Count 06/30/2024 11.1 (H)     RBC Count 06/30/2024 4.23 (L)     Hemoglobin 06/30/2024 12.2 (L)     Hematocrit 06/30/2024 38.0 (L)     MCV 06/30/2024 90     MCH 06/30/2024 28.8     MCHC 06/30/2024 32.1     RDW 06/30/2024 13.3     Platelet Count 06/30/2024 196         SERVANDO Moser [facility] General Surgery  [address]  [place] [address]

## 2024-07-01 NOTE — PROGRESS NOTES
"[facility]    Medicine Progress Note - Hospitalist Service    Date of Admission:  6/29/2024    Assessment & Plan   23 y/o M presented with abdominal pain and found to have acute perforated diverticulitis. General surgery consulted. IV antibiotics and conservative medical cares pending initial surgery evaluation. Diet can likely be restarted today - deferred to general surgery and he appears to have further improved after another day of bowel rest.     Acute perforated diverticulitis -- CT showed no additional disseminated free intraperitoneal gas, or pericolonic abscess. Abdomen not peritoneal on exams. No family history of diverticulitis     - IVF, NPO, anti emetics, PRN pain meds for mod/severe  - IV Cipro and flagyl to continue  - Gen surgery referral   - Pain and nausea control  - NPO until general surgery clears to restart diet - then clears and very slowly advance as tolerated.    - Per surgery, re-assessing 7/1/24 for possible diet restarting; another day of bowel rest was recommended for 6/30/24 given tenderness on exam per surgery        Diet: NPO for Medical/Clinical Reasons Except for: Ice Chips, Meds    DVT Prophylaxis: Low Risk/Ambulatory with no VTE prophylaxis indicated, Pneumatic Compression Devices, Anti-embolisim stockings (TEDs), Ambulate every shift, and VTE Prophylaxis contraindicated due to pending initial surgery evaluation and risk for needing invasive procedure   Raza Catheter: Not present  Lines: None     Cardiac Monitoring: None  Code Status: Full Code      Clinically Significant Risk Factors Present on Admission                              # Severe Obesity: Estimated body mass index is 53.47 kg/m  as calculated from the following:    Height as of this encounter: 1.753 m (5' 9\").    Weight as of this encounter: 164.2 kg (362 lb 1.6 oz).              Disposition Plan     Medically Ready for Discharge: Anticipated in 2-4 Days             Jesus Todd, " MD  Hospitalist Service  [facility]  Securely message with [employer] (more info)  Text page via [employer] Paging/Directory   ______________________________________________________________________    Interval History   No acute events overnight.    No report of chest pain, shortness of breath, or other new complaints.     Physical Exam   Vital Signs: Temp: 98.3  F (36.8  C) Temp src: Oral BP: 135/66 Pulse: 93   Resp: 16 SpO2: 94 % O2 Device: None (Room air)    Weight: 362 lbs 1.6 oz    GENERAL:  Alert, appears comfortable, in no acute distress, appears stated age, on ambient room air   HEAD:  Normocephalic, without obvious abnormality, atraumatic   EYES:  Conjunctiva/corneas clear, no scleral icterus, EOM's appears intact grossly   NOSE: Nares normal, septum midline, mucosa normal, no drainage   THROAT: Lips, mucosa, and tongue appear normal   NECK: Symmetrical, trachea appears midline   BACK:   Symmetric, no curvature noted   LUNGS:   Symmetric chest rise on inhalation, respirations unlabored   CHEST WALL:  No apparent deformity   HEART:  No thrills or heaves visualized   ABDOMEN:   Soft, benign, still no rigidity, no rebound, no masses or organomegaly apparent; non-scaphoid   EXTREMITIES: Extremities appear normal, atraumatic, no cyanosis   SKIN: No exanthems in the visualized areas   NEURO: Alert and oriented x4, moves all four extremities freely and spontaneously   PSYCH: Cooperative, behavior is appropriate       Medical Decision Making   41 MINUTES SPENT BY ME on the date of service doing chart review, history, exam, documentation & further activities per the note.          Data     I have personally reviewed the following data over the past 24 hrs:    11.1 (H)  \   12.2 (L)   / 196     N/A N/A N/A /  N/A   N/A N/A N/A \       Imaging results reviewed over the past 24 hrs:   No results found for this or any previous visit (from the past 24 hour(s)).

## 2024-07-01 NOTE — PROGRESS NOTES
PRIMARY DIAGNOSIS: ACUTE PAIN  OUTPATIENT/OBSERVATION GOALS TO BE MET BEFORE DISCHARGE:  1. Pain Status: Pain free.    2. Return to near baseline physical activity: Yes    3. Cleared for discharge by consultants (if involved): No    Discharge Planner Nurse   Safe discharge environment identified: Yes  Barriers to discharge: Yes       Entered by: HERNESTO RUBY RN 07/01/2024 12:29 AM     Please review provider order for any additional goals.   Nurse to notify provider when observation goals have been met and patient is ready for discharge.

## 2024-07-01 NOTE — PROGRESS NOTES
Therapy: IV ABX (Flagyl)  Insurance: [employer] [place]    This patient has [place] for IV ABX  through [place] [facility] [place], patient has a deductible of $600.00 met $0.00, once pt meets the deductible they are covered at 80%. Patient has an out of pocket of $3500.00 met $0.00. Once the out of pocket was met pt is covered at 100%.     In reference to hospital admission to [facility] on 6/29/24 and referral from Seema LANCE    Please contact Intake with any questions, [phone] or In Basket pool, [facility] ([phone]).

## 2024-07-01 NOTE — PLAN OF CARE
Goal Outcome Evaluation:      Problem: Adult Inpatient Plan of Care  Goal: Absence of Hospital-Acquired Illness or Injury  Intervention: Prevent Infection  Recent Flowsheet Documentation  Taken 6/30/2024 2357 by Clementina Torres RN  Infection Prevention:   single patient room provided   rest/sleep promoted   hand hygiene promoted     Problem: Pain Acute  Goal: Optimal Pain Control and Function  Intervention: Prevent or Manage Pain  Recent Flowsheet Documentation  Taken 6/30/2024 2357 by Clementina Torres RN  Sensory Stimulation Regulation:   care clustered   quiet environment promoted  Medication Review/Management: medications reviewed     Patient is alert & oriented x4. Vital signs are stable. Denies pain. Normal saline infusing at 125 ml/hr. IV antibiotics administered. NPO except ice chips and meds. Independent in the room.

## 2024-07-02 PROCEDURE — 250N000013 HC RX MED GY IP 250 OP 250 PS 637: Performed by: HOSPITALIST

## 2024-07-02 PROCEDURE — 250N000011 HC RX IP 250 OP 636: Performed by: HOSPITALIST

## 2024-07-02 PROCEDURE — 99231 SBSQ HOSP IP/OBS SF/LOW 25: CPT

## 2024-07-02 PROCEDURE — 258N000003 HC RX IP 258 OP 636: Performed by: HOSPITALIST

## 2024-07-02 PROCEDURE — 99232 SBSQ HOSP IP/OBS MODERATE 35: CPT | Performed by: HOSPITALIST

## 2024-07-02 PROCEDURE — 120N000001 HC R&B MED SURG/OB

## 2024-07-02 RX ADMIN — CIPROFLOXACIN 400 MG: 400 INJECTION, SOLUTION INTRAVENOUS at 15:37

## 2024-07-02 RX ADMIN — SODIUM CHLORIDE: 9 INJECTION, SOLUTION INTRAVENOUS at 04:29

## 2024-07-02 RX ADMIN — METRONIDAZOLE 500 MG: 500 INJECTION, SOLUTION INTRAVENOUS at 04:26

## 2024-07-02 RX ADMIN — HYDROCODONE BITARTRATE AND ACETAMINOPHEN 2 TABLET: 5; 325 TABLET ORAL at 22:03

## 2024-07-02 RX ADMIN — SODIUM CHLORIDE: 9 INJECTION, SOLUTION INTRAVENOUS at 22:04

## 2024-07-02 RX ADMIN — CIPROFLOXACIN 400 MG: 400 INJECTION, SOLUTION INTRAVENOUS at 02:52

## 2024-07-02 RX ADMIN — METRONIDAZOLE 500 MG: 500 INJECTION, SOLUTION INTRAVENOUS at 16:50

## 2024-07-02 ASSESSMENT — ACTIVITIES OF DAILY LIVING (ADL)
ADLS_ACUITY_SCORE: 20
ADLS_ACUITY_SCORE: 21
ADLS_ACUITY_SCORE: 20

## 2024-07-02 NOTE — PROGRESS NOTES
"[facility]    Medicine Progress Note - Hospitalist Service    Date of Admission:  6/29/2024    Assessment & Plan   25 y/o M presented with abdominal pain and found to have acute perforated diverticulitis. General surgery consulted. IV antibiotics and conservative medical cares pending initial surgery evaluation. Diet restarted on 7/1/24 and so far tolerating clears. ADAT as per surgery.     Acute perforated diverticulitis -- CT showed no additional disseminated free intraperitoneal gas, or pericolonic abscess. Abdomen not peritoneal on exams. No family history of diverticulitis     - IVF, NPO, anti emetics, PRN pain meds for mod/severe  - IV Cipro and flagyl to continue  - Gen surgery referral   - Pain and nausea control  - ADAT as per surgery        Diet: Clear Liquid Diet    DVT Prophylaxis: Low Risk/Ambulatory with no VTE prophylaxis indicated, Pneumatic Compression Devices, Anti-embolisim stockings (TEDs), Ambulate every shift, and VTE Prophylaxis contraindicated due to pending initial surgery evaluation and risk for needing invasive procedure   Raza Catheter: Not present  Lines: None     Cardiac Monitoring: None  Code Status: Full Code      Clinically Significant Risk Factors                                 # Severe Obesity: Estimated body mass index is 53.47 kg/m  as calculated from the following:    Height as of this encounter: 1.753 m (5' 9\").    Weight as of this encounter: 164.2 kg (362 lb 1.6 oz)., PRESENT ON ADMISSION            Disposition Plan     Medically Ready for Discharge: Anticipated in 2-4 Days             Jesus Todd MD  Hospitalist Service  [facility]  Securely message with Infer (more info)  Text page via [employer] Paging/Directory   ______________________________________________________________________    Interval History   No acute events overnight.    Tolerating clears well. He is willing to advance when able.. No report of chest pain, " shortness of breath, or other new complaints. Discussed plan of care with patient. Answered all questions to patient's verbalized understanding and satisfaction.  Physical Exam   Vital Signs: Temp: 97.8  F (36.6  C) Temp src: Oral BP: (!) 143/79 Pulse: 83   Resp: 18 SpO2: 96 % O2 Device: None (Room air)    Weight: 362 lbs 1.6 oz    GENERAL:  Alert, appears comfortable, in no acute distress, appears stated age, on ambient room air   HEAD:  Normocephalic, without obvious abnormality, atraumatic   EYES:  Conjunctiva/corneas clear, no scleral icterus, EOM's appears intact grossly   NOSE: Nares normal, septum midline, mucosa normal, no drainage   THROAT: Lips, mucosa, and tongue appear normal   NECK: Symmetrical, trachea appears midline   BACK:   Symmetric, no curvature noted   LUNGS:   Symmetric chest rise on inhalation, respirations unlabored   CHEST WALL:  No apparent deformity   HEART:  No thrills or heaves visualized   ABDOMEN:   Soft, benign, still no rigidity, no rebound, no masses or organomegaly apparent; non-scaphoid   EXTREMITIES: Extremities appear normal, atraumatic, no cyanosis   SKIN: No exanthems in the visualized areas   NEURO: Alert and oriented x4, moves all four extremities freely and spontaneously   PSYCH: Cooperative, behavior is appropriate       Medical Decision Making   36 MINUTES SPENT BY ME on the date of service doing chart review, history, exam, documentation & further activities per the note.            Data         Imaging results reviewed over the past 24 hrs:   No results found for this or any previous visit (from the past 24 hour(s)).

## 2024-07-02 NOTE — PROGRESS NOTES
General Surgery Progress Note:    Hospital Day # 2    ASSESSMENT:   1. Dysuria    2. Constipation, unspecified constipation type    3. Diverticulitis      Edmund Vieira is a 24 year old male who presents with perforated diverticulitis. Afebrile, no tachycardia and last WBC was normal. Patient progressing well. Continue IV antibiotics and OK to transition to PO for discharge. No abdominal tenderness on exam. Tolerating clear liquid diet. Advance to low fiber diet. OK to discharge from surgical standpoint when deemed medically appropriate once tolerating a low fiber diet and 2 total weeks of antibiotics    PLAN:   - Advance to low fiber diet  - Continue antibiotics  - OK to discharge from surgical standpoint when deemed medically appropriate once tolerating a low fiber diet and 2 total weeks of antibiotics  - Discharge recommendations and instructions placed in AVS   - Follow up scheduled   - Medical management per primary team    SUBJECTIVE:   Edmund Vieira is doing well today. Reports no abdominal pain. Passing flatus and having loose brown BMs. Tolerating clear liquid diet with no nausea, vomiting. Is hungry and would like a diet advancement. Voiding with no issues. Denies fever, chills.    Patient Vitals for the past 24 hrs:   BP Temp Temp src Pulse Resp SpO2   07/02/24 0355 (!) 142/67 98.4  F (36.9  C) Oral 85 18 95 %   07/01/24 2354 130/63 98  F (36.7  C) Oral 88 18 96 %   07/01/24 1900 137/71 98.7  F (37.1  C) Oral 89 16 98 %   07/01/24 1515 133/64 97.9  F (36.6  C) Oral 85 16 97 %     Physical Exam:  General: patient seen resting in bed, no acute distress  Resp: no respiratory distress, breathing comfortably on RA  Abdomen: soft, obese, non-tender, no rebound tenderness or guarding  Extremities: warm and well perfused    Admission on 06/29/2024   Component Date Value    Color Urine 06/29/2024 Light Yellow     Appearance Urine 06/29/2024 Clear     Glucose Urine 06/29/2024 Negative     Bilirubin Urine  06/29/2024 Negative     Ketones Urine 06/29/2024 Negative     Specific Gravity Urine 06/29/2024 1.015     Blood Urine 06/29/2024 0.06 mg/dL (A)     pH Urine 06/29/2024 6.0     Protein Albumin Urine 06/29/2024 Negative     Urobilinogen Urine 06/29/2024 <2.0     Nitrite Urine 06/29/2024 Negative     Leukocyte Esterase Urine 06/29/2024 Negative     Mucus Urine 06/29/2024 Present (A)     RBC Urine 06/29/2024 1     WBC Urine 06/29/2024 <1     Chlamydia trachomatis 06/29/2024 Negative     Neisseria gonorrhoeae 06/29/2024 Negative     Sodium 06/29/2024 139     Potassium 06/29/2024 3.9     Chloride 06/29/2024 103     Carbon Dioxide (CO2) 06/29/2024 27     Anion Gap 06/29/2024 9     Urea Nitrogen 06/29/2024 12.4     Creatinine 06/29/2024 0.91     GFR Estimate 06/29/2024 >90     Calcium 06/29/2024 9.1     Glucose 06/29/2024 102 (H)     WBC Count 06/29/2024 13.8 (H)     RBC Count 06/29/2024 4.50     Hemoglobin 06/29/2024 13.0 (L)     Hematocrit 06/29/2024 40.4     MCV 06/29/2024 90     MCH 06/29/2024 28.9     MCHC 06/29/2024 32.2     RDW 06/29/2024 13.8     Platelet Count 06/29/2024 236     % Neutrophils 06/29/2024 70     % Lymphocytes 06/29/2024 19     % Monocytes 06/29/2024 9     % Eosinophils 06/29/2024 1     % Basophils 06/29/2024 0     % Immature Granulocytes 06/29/2024 0     NRBCs per 100 WBC 06/29/2024 0     Absolute Neutrophils 06/29/2024 9.7 (H)     Absolute Lymphocytes 06/29/2024 2.7     Absolute Monocytes 06/29/2024 1.2     Absolute Eosinophils 06/29/2024 0.1     Absolute Basophils 06/29/2024 0.0     Absolute Immature Granul* 06/29/2024 0.1     Absolute NRBCs 06/29/2024 0.0     Radiologist flags 06/29/2024 Perforated viscus (acute perforated diverticulitis) (AA)     Sodium 06/29/2024 138     Potassium 06/29/2024 3.8     Carbon Dioxide (CO2) 06/29/2024 26     Anion Gap 06/29/2024 7     Urea Nitrogen 06/29/2024 9.6     Creatinine 06/29/2024 0.75     GFR Estimate 06/29/2024 >90     Calcium 06/29/2024 8.5 (L)      Chloride 06/29/2024 105     Glucose 06/29/2024 107 (H)     Alkaline Phosphatase 06/29/2024 64     AST 06/29/2024 17     ALT 06/29/2024 22     Protein Total 06/29/2024 7.0     Albumin 06/29/2024 3.7     Bilirubin Total 06/29/2024 0.4     WBC Count 06/29/2024 11.6 (H)     RBC Count 06/29/2024 4.32 (L)     Hemoglobin 06/29/2024 12.4 (L)     Hematocrit 06/29/2024 38.3 (L)     MCV 06/29/2024 89     MCH 06/29/2024 28.7     MCHC 06/29/2024 32.4     RDW 06/29/2024 13.6     Platelet Count 06/29/2024 209     % Neutrophils 06/29/2024 73     % Lymphocytes 06/29/2024 15     % Monocytes 06/29/2024 10     % Eosinophils 06/29/2024 1     % Basophils 06/29/2024 0     % Immature Granulocytes 06/29/2024 1     NRBCs per 100 WBC 06/29/2024 0     Absolute Neutrophils 06/29/2024 8.4 (H)     Absolute Lymphocytes 06/29/2024 1.8     Absolute Monocytes 06/29/2024 1.2     Absolute Eosinophils 06/29/2024 0.2     Absolute Basophils 06/29/2024 0.0     Absolute Immature Granul* 06/29/2024 0.1     Absolute NRBCs 06/29/2024 0.0     Lactic Acid, Initial 06/29/2024 0.8     WBC Count 06/30/2024 11.1 (H)     RBC Count 06/30/2024 4.23 (L)     Hemoglobin 06/30/2024 12.2 (L)     Hematocrit 06/30/2024 38.0 (L)     MCV 06/30/2024 90     MCH 06/30/2024 28.8     MCHC 06/30/2024 32.1     RDW 06/30/2024 13.3     Platelet Count 06/30/2024 196     WBC Count 07/01/2024 7.7     RBC Count 07/01/2024 4.22 (L)     Hemoglobin 07/01/2024 12.3 (L)     Hematocrit 07/01/2024 37.8 (L)     MCV 07/01/2024 90     MCH 07/01/2024 29.1     MCHC 07/01/2024 32.5     RDW 07/01/2024 13.3     Platelet Count 07/01/2024 208     Hold Specimen 07/01/2024 GABINO Salcedo PA-C  [facility]  [address]  [place] [address]

## 2024-07-02 NOTE — PLAN OF CARE
Goal Outcome Evaluation:    Patient is alert and oriented x4. VSS and on RA. No complaints of pain, sob, chest pain, dizziness, nor n/v. Patient reports tolerable and intermittent abdominal discomfort. Refuses pain medication. Independent in room, steady. PIV is intact and NS infusing at 125 ml/hr. Call light within reach.     Problem: Adult Inpatient Plan of Care  Goal: Absence of Hospital-Acquired Illness or Injury  Intervention: Identify and Manage Fall Risk  Recent Flowsheet Documentation  Taken 7/1/2024 2354 by Isela Garcia RN  Safety Promotion/Fall Prevention: safety round/check completed  Taken 7/1/2024 2029 by Isela Garcia RN  Safety Promotion/Fall Prevention: safety round/check completed  Intervention: Prevent Skin Injury  Recent Flowsheet Documentation  Taken 7/1/2024 2354 by Isela Garcia RN  Body Position: position changed independently  Skin Protection: adhesive use limited  Device Skin Pressure Protection:   adhesive use limited   tubing/devices free from skin contact  Taken 7/1/2024 2029 by Isela Garcia RN  Skin Protection: adhesive use limited  Device Skin Pressure Protection:   adhesive use limited   tubing/devices free from skin contact  Intervention: Prevent Infection  Recent Flowsheet Documentation  Taken 7/1/2024 2354 by Isela Garcia RN  Infection Prevention:   hand hygiene promoted   rest/sleep promoted   single patient room provided  Taken 7/1/2024 2029 by Isela Garcia RN  Infection Prevention:   hand hygiene promoted   rest/sleep promoted   single patient room provided  Goal: Optimal Comfort and Wellbeing  Outcome: Progressing

## 2024-07-02 NOTE — PLAN OF CARE
I attest to Florence Alvarado, Student Nurse Intern  Lexis Herrera RN    Pt is A&O, VSS, and CMS intact. Independent in room. Voiding well and advanced to a low fiber diet. Discharge pending on if pt tolerates diet advancement. Call light appropriate, is able to make needs known, and is resting between cares.   Florence Alvraado, Student Nurse Intern      Problem: Adult Inpatient Plan of Care  Goal: Plan of Care Review  Description: The Plan of Care Review/Shift note should be completed every shift.  The Outcome Evaluation is a brief statement about your assessment that the patient is improving, declining, or no change.  This information will be displayed automatically on your shift  note.  Outcome: Progressing     Problem: Adult Inpatient Plan of Care  Goal: Optimal Comfort and Wellbeing  Outcome: Progressing     Problem: Adult Inpatient Plan of Care  Goal: Readiness for Transition of Care  Outcome: Progressing

## 2024-07-03 VITALS
HEART RATE: 91 BPM | RESPIRATION RATE: 18 BRPM | WEIGHT: 315 LBS | DIASTOLIC BLOOD PRESSURE: 81 MMHG | BODY MASS INDEX: 46.65 KG/M2 | HEIGHT: 69 IN | TEMPERATURE: 98.2 F | SYSTOLIC BLOOD PRESSURE: 110 MMHG | OXYGEN SATURATION: 97 %

## 2024-07-03 PROCEDURE — 250N000013 HC RX MED GY IP 250 OP 250 PS 637: Performed by: HOSPITALIST

## 2024-07-03 PROCEDURE — 250N000011 HC RX IP 250 OP 636: Performed by: HOSPITALIST

## 2024-07-03 PROCEDURE — 99231 SBSQ HOSP IP/OBS SF/LOW 25: CPT

## 2024-07-03 PROCEDURE — 99239 HOSP IP/OBS DSCHRG MGMT >30: CPT | Performed by: HOSPITALIST

## 2024-07-03 RX ORDER — METRONIDAZOLE 500 MG/1
500 TABLET ORAL 3 TIMES DAILY
Qty: 30 TABLET | Refills: 0 | Status: SHIPPED | OUTPATIENT
Start: 2024-07-03 | End: 2024-07-13

## 2024-07-03 RX ORDER — CIPROFLOXACIN 500 MG/1
500 TABLET, FILM COATED ORAL EVERY 12 HOURS SCHEDULED
Status: DISCONTINUED | OUTPATIENT
Start: 2024-07-03 | End: 2024-07-03 | Stop reason: HOSPADM

## 2024-07-03 RX ORDER — CIPROFLOXACIN 500 MG/1
500 TABLET, FILM COATED ORAL EVERY 12 HOURS
Qty: 20 TABLET | Refills: 0 | Status: SHIPPED | OUTPATIENT
Start: 2024-07-03 | End: 2024-07-13

## 2024-07-03 RX ORDER — METRONIDAZOLE 500 MG/1
500 TABLET ORAL 3 TIMES DAILY
Status: DISCONTINUED | OUTPATIENT
Start: 2024-07-03 | End: 2024-07-03 | Stop reason: HOSPADM

## 2024-07-03 RX ADMIN — CIPROFLOXACIN 400 MG: 400 INJECTION, SOLUTION INTRAVENOUS at 03:19

## 2024-07-03 RX ADMIN — METRONIDAZOLE 500 MG: 500 INJECTION, SOLUTION INTRAVENOUS at 04:36

## 2024-07-03 RX ADMIN — METRONIDAZOLE 500 MG: 500 TABLET, FILM COATED ORAL at 08:12

## 2024-07-03 ASSESSMENT — ACTIVITIES OF DAILY LIVING (ADL)
ADLS_ACUITY_SCORE: 20

## 2024-07-03 NOTE — PLAN OF CARE
Pt transferred to discharge lounge w/ family. Instructions gone over, all questions were addressed. Discharged home in stable condition at 1200.

## 2024-07-03 NOTE — PLAN OF CARE
Problem: Adult Inpatient Plan of Care  Goal: Readiness for Transition of Care  Outcome: Adequate for Care Transition            Alert and oriented times 4. IND in room. Denies pain. Denies any chest pains, lightheadedness, or nausea. Spouse is in room. Wheeled patient to discharge lounge. Discharge nurse taking over care.

## 2024-07-03 NOTE — PLAN OF CARE
Problem: Pain Acute  Goal: Optimal Pain Control and Function  Intervention: Prevent or Manage Pain  Recent Flowsheet Documentation  Taken 7/2/2024 1925 by Pilar Morton, RN  Sensory Stimulation Regulation:   care clustered   lighting decreased   quiet environment promoted  Sleep/Rest Enhancement:   awakenings minimized   comfort measures   consistent schedule promoted   regular sleep/rest pattern promoted   relaxation techniques promoted   room darkened  Medication Review/Management: medications reviewed     A/Ox4, using call light appropriately. VSS on RA. Tolerating low fiber diet, bowel sounds remain hypoactive. Complained of migraine pain r/t tooth pain, PRN Norco given and effective per patient. IV Cipro given, however IV access lost before Flagyl could be given. Patient requested he start PO antibiotics rather than getting new IV, Dr. Ortega notified and orders placed for PO Flagyl. Up independently in room.

## 2024-07-03 NOTE — PROGRESS NOTES
General Surgery Progress Note:    Hospital Day # 3    ASSESSMENT:   1. Dysuria    2. Constipation, unspecified constipation type    3. Diverticulitis      Edmund Vieira is a 24 year old male who presents with perforated diverticulitis. Patient progressing well and no abdominal tenderness on exam. Continue IV antibiotics and OK to transition to PO for discharge. Tolerating low fiber diet. OK to discharge from surgical standpoint when deemed medically appropriate once tolerating a low fiber diet and 2 total weeks of antibiotics     PLAN:   - Low fiber diet  - Continue antibiotics  - OK to discharge from surgical standpoint when deemed medically appropriate on a low fiber diet and 2 total weeks of antibiotics  - Discharge recommendations and instructions placed in AVS   - Follow up scheduled   - Medical management per primary team    SUBJECTIVE:   Edmund Vieira is doing well. Does not complain of abdominal pain. Tolerating low fiber diet with no nausea or vomiting. Passing flatus and having BMs. Voiding with no issues. Denies fever, chills. Ambulating independently.    Patient Vitals for the past 24 hrs:   BP Temp Temp src Pulse Resp SpO2   07/03/24 0808 110/81 98.2  F (36.8  C) Oral 91 18 --   07/03/24 0400 127/58 97.5  F (36.4  C) Oral 80 18 97 %   07/03/24 0030 129/60 98  F (36.7  C) Oral 90 16 93 %   07/02/24 2032 124/57 97.2  F (36.2  C) Oral 74 16 98 %   07/02/24 1644 134/70 98.4  F (36.9  C) Oral 88 16 96 %     Physical Exam:  General: patient seen resting in bed, no acute distress  Resp: no respiratory distress, breathing comfortably on RA  Abdomen: soft, obese, non-tender, no rebound tenderness or guarding  Extremities: warm and well perfused    Admission on 06/29/2024   Component Date Value    Color Urine 06/29/2024 Light Yellow     Appearance Urine 06/29/2024 Clear     Glucose Urine 06/29/2024 Negative     Bilirubin Urine 06/29/2024 Negative     Ketones Urine 06/29/2024 Negative     Specific Gravity  Urine 06/29/2024 1.015     Blood Urine 06/29/2024 0.06 mg/dL (A)     pH Urine 06/29/2024 6.0     Protein Albumin Urine 06/29/2024 Negative     Urobilinogen Urine 06/29/2024 <2.0     Nitrite Urine 06/29/2024 Negative     Leukocyte Esterase Urine 06/29/2024 Negative     Mucus Urine 06/29/2024 Present (A)     RBC Urine 06/29/2024 1     WBC Urine 06/29/2024 <1     Chlamydia trachomatis 06/29/2024 Negative     Neisseria gonorrhoeae 06/29/2024 Negative     Sodium 06/29/2024 139     Potassium 06/29/2024 3.9     Chloride 06/29/2024 103     Carbon Dioxide (CO2) 06/29/2024 27     Anion Gap 06/29/2024 9     Urea Nitrogen 06/29/2024 12.4     Creatinine 06/29/2024 0.91     GFR Estimate 06/29/2024 >90     Calcium 06/29/2024 9.1     Glucose 06/29/2024 102 (H)     WBC Count 06/29/2024 13.8 (H)     RBC Count 06/29/2024 4.50     Hemoglobin 06/29/2024 13.0 (L)     Hematocrit 06/29/2024 40.4     MCV 06/29/2024 90     MCH 06/29/2024 28.9     MCHC 06/29/2024 32.2     RDW 06/29/2024 13.8     Platelet Count 06/29/2024 236     % Neutrophils 06/29/2024 70     % Lymphocytes 06/29/2024 19     % Monocytes 06/29/2024 9     % Eosinophils 06/29/2024 1     % Basophils 06/29/2024 0     % Immature Granulocytes 06/29/2024 0     NRBCs per 100 WBC 06/29/2024 0     Absolute Neutrophils 06/29/2024 9.7 (H)     Absolute Lymphocytes 06/29/2024 2.7     Absolute Monocytes 06/29/2024 1.2     Absolute Eosinophils 06/29/2024 0.1     Absolute Basophils 06/29/2024 0.0     Absolute Immature Granul* 06/29/2024 0.1     Absolute NRBCs 06/29/2024 0.0     Radiologist flags 06/29/2024 Perforated viscus (acute perforated diverticulitis) (AA)     Sodium 06/29/2024 138     Potassium 06/29/2024 3.8     Carbon Dioxide (CO2) 06/29/2024 26     Anion Gap 06/29/2024 7     Urea Nitrogen 06/29/2024 9.6     Creatinine 06/29/2024 0.75     GFR Estimate 06/29/2024 >90     Calcium 06/29/2024 8.5 (L)     Chloride 06/29/2024 105     Glucose 06/29/2024 107 (H)     Alkaline Phosphatase  06/29/2024 64     AST 06/29/2024 17     ALT 06/29/2024 22     Protein Total 06/29/2024 7.0     Albumin 06/29/2024 3.7     Bilirubin Total 06/29/2024 0.4     WBC Count 06/29/2024 11.6 (H)     RBC Count 06/29/2024 4.32 (L)     Hemoglobin 06/29/2024 12.4 (L)     Hematocrit 06/29/2024 38.3 (L)     MCV 06/29/2024 89     MCH 06/29/2024 28.7     MCHC 06/29/2024 32.4     RDW 06/29/2024 13.6     Platelet Count 06/29/2024 209     % Neutrophils 06/29/2024 73     % Lymphocytes 06/29/2024 15     % Monocytes 06/29/2024 10     % Eosinophils 06/29/2024 1     % Basophils 06/29/2024 0     % Immature Granulocytes 06/29/2024 1     NRBCs per 100 WBC 06/29/2024 0     Absolute Neutrophils 06/29/2024 8.4 (H)     Absolute Lymphocytes 06/29/2024 1.8     Absolute Monocytes 06/29/2024 1.2     Absolute Eosinophils 06/29/2024 0.2     Absolute Basophils 06/29/2024 0.0     Absolute Immature Granul* 06/29/2024 0.1     Absolute NRBCs 06/29/2024 0.0     Lactic Acid, Initial 06/29/2024 0.8     WBC Count 06/30/2024 11.1 (H)     RBC Count 06/30/2024 4.23 (L)     Hemoglobin 06/30/2024 12.2 (L)     Hematocrit 06/30/2024 38.0 (L)     MCV 06/30/2024 90     MCH 06/30/2024 28.8     MCHC 06/30/2024 32.1     RDW 06/30/2024 13.3     Platelet Count 06/30/2024 196     WBC Count 07/01/2024 7.7     RBC Count 07/01/2024 4.22 (L)     Hemoglobin 07/01/2024 12.3 (L)     Hematocrit 07/01/2024 37.8 (L)     MCV 07/01/2024 90     MCH 07/01/2024 29.1     MCHC 07/01/2024 32.5     RDW 07/01/2024 13.3     Platelet Count 07/01/2024 208     Hold Specimen 07/01/2024 GABINO Salcedo PA-C  [facility]  [facility] [address]

## 2024-07-03 NOTE — DISCHARGE SUMMARY
"[facility]  Hospitalist Discharge Summary      Date of Admission:  6/29/2024  Date of Discharge:  7/3/2024  Discharging Provider: Jesus Todd MD  Discharge Service: Hospitalist Service    Discharge Diagnoses   Acute perforated diverticulitis     Clinically Significant Risk Factors     # Severe Obesity: Estimated body mass index is 53.47 kg/m  as calculated from the following:    Height as of this encounter: 1.753 m (5' 9\").    Weight as of this encounter: 164.2 kg (362 lb 1.6 oz).       Follow-ups Needed After Discharge   Follow-up Appointments     Follow-up and recommended labs and tests       General Surgery - If you have an appointment scheduled already, the date   and time will be printed on the AVS. If you do not have an appointment   scheduled and would like to schedule a follow up appointment, please call   us at [phone] to schedule.        Follow-up and recommended labs and tests       Follow up with primary care provider, Physician No Ref-Primary, within 7   days for hospital follow- up.            Unresulted Labs Ordered in the Past 30 Days of this Admission       No orders found from 5/30/2024 to 6/30/2024.        These results will be followed up by NA    Discharge Disposition   Discharged to home  Condition at discharge: Good    Hospital Course   25 y/o M presented with abdominal pain and found to have acute perforated diverticulitis. General surgery consulted. IV antibiotics and conservative medical cares were implemented and general surgery agreed with this plan and monitored. When clinically improved, his diet was restarted and advanced slowly as tolerated. By day of discharge, he was tolerating a regular diet well and had no fever, no further nausea or vomiting, and was transitioned to oral antibiotics. A total 2-week course of antibiotics prescribed as per general surgery. PCP follow-up.     Consultations This Hospital Stay   SURGERY GENERAL IP CONSULT    Code " Status   Full Code    Time Spent on this Encounter   I, Jesus Todd MD, personally saw the patient today and spent greater than 30 minutes discharging this patient.       Jesus Todd MD  [address] [address]  Phone: [phone]  Fax: [phone]  ______________________________________________________________________    Physical Exam   Vital Signs: Temp: 98.2  F (36.8  C) Temp src: Oral BP: 110/81 Pulse: 91   Resp: 18 SpO2: 97 % O2 Device: None (Room air)    Weight: 362 lbs 1.6 oz  GENERAL:  Alert, appears comfortable, in no acute distress, appears stated age, on ambient room air   HEAD:  Normocephalic, without obvious abnormality, atraumatic   EYES:  Conjunctiva/corneas clear, no scleral icterus, EOM's appears intact grossly   NOSE: Nares normal, septum midline, mucosa normal, no drainage   THROAT: Lips, mucosa, and tongue appear normal   NECK: Symmetrical, trachea appears midline   BACK:   Symmetric, no curvature noted   LUNGS:   Symmetric chest rise on inhalation, respirations unlabored   CHEST WALL:  No apparent deformity   HEART:  No thrills or heaves visualized   ABDOMEN:   Nontender, no rigidity, no rebound. No masses or organomegaly apparent; non-scaphoid   EXTREMITIES: Extremities appear normal, atraumatic, no cyanosis   SKIN: No exanthems in the visualized areas   NEURO: Alert and oriented x4, moves all four extremities freely and spontaneously   PSYCH: Cooperative, behavior is appropriate            Primary Care Physician   Physician No Ref-Primary    Discharge Orders      Primary Care Referral      Follow-up and recommended labs and tests     General Surgery - If you have an appointment scheduled already, the date and time will be printed on the AVS. If you do not have an appointment scheduled and would like to schedule a follow up appointment, please call us at [phone] to schedule.     Reason for your hospital stay     Acute diverticulitis with perforation; evaluations by hospital medicine and general surgery.     Follow-up and recommended labs and tests     Follow up with primary care provider, Physician No Ref-Primary, within 7 days for hospital follow- up.     Activity    Your activity upon discharge: activity as tolerated     Diet    Low fiber diet. Please see AVS instructions for specifics on this diet.     Diet    Follow this diet upon discharge: Orders Placed This Encounter      Low Fiber Diet      Diet         Significant Results and Procedures   Most Recent 3 CBC's:  Recent Labs   Lab Test 07/01/24  0812 06/30/24  0811 06/29/24  0607   WBC 7.7 11.1* 11.6*   HGB 12.3* 12.2* 12.4*   MCV 90 90 89    196 209     Most Recent 3 BMP's:  Recent Labs   Lab Test 06/29/24  0607 06/29/24  0109    139   POTASSIUM 3.8 3.9   CHLORIDE 105 103   CO2 26 27   BUN 9.6 12.4   CR 0.75 0.91   ANIONGAP 7 9   ERICH 8.5* 9.1   * 102*     Most Recent 2 LFT's:  Recent Labs   Lab Test 06/29/24  0607   AST 17   ALT 22   ALKPHOS 64   BILITOTAL 0.4   ,   Results for orders placed or performed during the hospital encounter of 06/29/24   CT Abdomen Pelvis w Contrast     Value    Radiologist flags (AA)     Perforated viscus (acute perforated diverticulitis)    Narrative    EXAM: CT ABDOMEN PELVIS W CONTRAST  LOCATION: [facility]  DATE: 6/29/2024    INDICATION: lower abdominal pain  COMPARISON: None.  TECHNIQUE: CT scan of the abdomen and pelvis was performed following injection of IV contrast. Multiplanar reformats were obtained. Dose reduction techniques were used.  CONTRAST: 90 ML ISOVUE 370    FINDINGS:   LOWER CHEST: Normal.    HEPATOBILIARY: Marked diffuse hepatic steatosis. Unremarkable gallbladder.    PANCREAS: Normal.    SPLEEN: Normal.    ADRENAL GLANDS: Normal.    KIDNEYS/BLADDER: Normal.    BOWEL: Pronounced pericolonic fat stranding at the descending colon / sigmoid colon junction in the region of  what appears to be an inflamed diverticulum. Small focus of free intraperitoneal gas in this region. Findings consistent with acute perforated   diverticulitis. No additional disseminated free intraperitoneal gas, or pericolonic abscess. Moderate colonic stool. Normal appendix. No bowel obstruction.    LYMPH NODES: Normal.    VASCULATURE: Normal.    PELVIC ORGANS: Normal.    MUSCULOSKELETAL: Multilevel degenerative changes of the thoracic and lumbosacral spine. No acute osseous abnormality.      Impression    IMPRESSION:   1.  Pronounced pericolonic fat stranding at the descending colon / sigmoid colon junction in the region of what appears to be an inflamed diverticulum. Small focus of free intraperitoneal gas in this region. Findings consistent with acute perforated   diverticulitis. No additional disseminated free intraperitoneal gas, or pericolonic abscess.  2.  Marked diffuse hepatic steatosis.      [Critical Result: Perforated viscus (acute perforated diverticulitis)]    Finding was identified on 6/29/2024 2:46 AM CDT.     Dr. Vicky Eldridge was contacted by me on 6/29/2024 2:52 AM CDT and verbalized understanding of the critical result.        Discharge Medications   Current Discharge Medication List        START taking these medications    Details   ciprofloxacin (CIPRO) 500 MG tablet Take 1 tablet (500 mg) by mouth every 12 hours for 10 days  Qty: 20 tablet, Refills: 0    Associated Diagnoses: Diverticulitis; Perforated diverticulum of large intestine      docusate sodium (COLACE) 250 MG capsule Take 1 capsule (250 mg) by mouth 2 times daily as needed for constipation  Qty: 28 capsule, Refills: 0      metroNIDAZOLE (FLAGYL) 500 MG tablet Take 1 tablet (500 mg) by mouth 3 times daily for 10 days  Qty: 30 tablet, Refills: 0    Associated Diagnoses: Diverticulitis; Perforated diverticulum of large intestine           CONTINUE these medications which have NOT CHANGED    Details   ibuprofen (ADVIL/MOTRIN) 200  MG capsule Take 400-600 mg by mouth every 8 hours as needed for moderate pain           STOP taking these medications       phenazopyridine (PYRIDIUM) 200 MG tablet Comments:   Reason for Stopping:             Allergies   Allergies   Allergen Reactions    Adhesive Tape Hives

## 2024-07-11 ENCOUNTER — NURSE TRIAGE (OUTPATIENT)
Dept: FAMILY MEDICINE | Facility: CLINIC | Age: 24
End: 2024-07-11
Payer: COMMERCIAL

## 2024-07-11 NOTE — TELEPHONE ENCOUNTER
Patient is calling in with new abdominal pain/discomfort. Pain is located lower left abdomen and states this is where he was told the diverticulitis was. Hospitalized 6/29-7/3.  Rating pain 3-4/10 and mild. Pain is intermittent and mostly when changing positions. Pain is not affected by eating. Having 2-3 bowel movements a day. A little bloated. Minimal nausea, no vomiting. Disposition of see in office today or tomorrow. Appointment scheduled for tomorrow with Silvia hCapman CNP. Pt is scheduled to establish care on Monday with Abby Riddle NP.     Reason for Disposition   MILD pain (e.g., does not interfere with normal activities) and pain comes and goes (cramps) lasts > 48 hours  (Exception: This same abdominal pain is a chronic symptom recurrent or ongoing AND present > 4 weeks.)    Additional Information   Negative: Passed out (i.e., fainted, collapsed and was not responding)   Negative: Shock suspected (e.g., cold/pale/clammy skin, too weak to stand, low BP, rapid pulse)   Negative: Sounds like a life-threatening emergency to the triager   Negative: Followed an abdomen (stomach) injury   Negative: Chest pain   Negative: Pain is mainly in upper abdomen (if needed ask: 'is it mainly above the belly button?')   Negative: Abdomen bloating or swelling are main symptoms   Negative: SEVERE abdominal pain (e.g., excruciating)   Negative: Vomiting red blood or black (coffee ground) material   Negative: Blood in bowel movements  (Exception: Blood on surface of BM with constipation.)   Negative: Black or tarry bowel movements  (Exception: Chronic-unchanged black-grey BMs AND is taking iron pills or Pepto-Bismol.)   Negative: Unable to urinate (or only a few drops) and bladder feels very full   Negative: Pain in scrotum persists > 1 hour   Negative: MILD TO MODERATE constant pain lasting > 2 hours   Negative: Vomiting bile (green color)   Negative: Patient sounds very sick or weak to the triager   Negative: Vomiting  and abdomen looks much more swollen than usual   Negative: White of the eyes have turned yellow (i.e., jaundice)   Negative: Blood in urine (red, pink, or tea-colored)   Negative: Fever > 103 F (39.4 C)   Negative: Fever > 101 F (38.3 C) and over 60 years of age   Negative: Fever > 100.0 F (37.8 C) and has diabetes mellitus or a weak immune system (e.g., HIV positive, cancer chemotherapy, organ transplant, splenectomy, chronic steroids)   Negative: Fever > 100.0 F (37.8 C) and bedridden (e.g., CVA, chronic illness, recovering from surgery)   Negative: MODERATE pain (e.g., interferes with normal activities) that comes and goes (cramps) lasts > 24 hours  (Exception: Pain with Vomiting or Diarrhea - see that Protocol.)   Negative: Age > 60 years   Negative: Patient wants to be seen    Protocols used: Abdominal Pain - Male-A-[place]    Christin HODGE RN

## 2024-07-12 ENCOUNTER — OFFICE VISIT (OUTPATIENT)
Dept: FAMILY MEDICINE | Facility: CLINIC | Age: 24
End: 2024-07-12
Payer: COMMERCIAL

## 2024-07-12 VITALS
HEIGHT: 69 IN | DIASTOLIC BLOOD PRESSURE: 86 MMHG | RESPIRATION RATE: 20 BRPM | WEIGHT: 315 LBS | SYSTOLIC BLOOD PRESSURE: 120 MMHG | BODY MASS INDEX: 46.65 KG/M2 | HEART RATE: 114 BPM | OXYGEN SATURATION: 97 % | TEMPERATURE: 98.5 F

## 2024-07-12 DIAGNOSIS — R19.7 DIARRHEA, UNSPECIFIED TYPE: Primary | ICD-10-CM

## 2024-07-12 DIAGNOSIS — K57.20 PERFORATED DIVERTICULUM OF LARGE INTESTINE: ICD-10-CM

## 2024-07-12 DIAGNOSIS — R10.32 LLQ ABDOMINAL PAIN: ICD-10-CM

## 2024-07-12 LAB
CRP SERPL-MCNC: 25.3 MG/L
ERYTHROCYTE [DISTWIDTH] IN BLOOD BY AUTOMATED COUNT: 13.3 % (ref 10–15)
ERYTHROCYTE [SEDIMENTATION RATE] IN BLOOD BY WESTERGREN METHOD: 15 MM/HR (ref 0–15)
HCT VFR BLD AUTO: 44.6 % (ref 40–53)
HGB BLD-MCNC: 14.3 G/DL (ref 13.3–17.7)
MCH RBC QN AUTO: 28.5 PG (ref 26.5–33)
MCHC RBC AUTO-ENTMCNC: 32.1 G/DL (ref 31.5–36.5)
MCV RBC AUTO: 89 FL (ref 78–100)
PLATELET # BLD AUTO: 277 10E3/UL (ref 150–450)
RBC # BLD AUTO: 5.02 10E6/UL (ref 4.4–5.9)
WBC # BLD AUTO: 7.6 10E3/UL (ref 4–11)

## 2024-07-12 PROCEDURE — 36415 COLL VENOUS BLD VENIPUNCTURE: CPT | Performed by: NURSE PRACTITIONER

## 2024-07-12 PROCEDURE — 85652 RBC SED RATE AUTOMATED: CPT | Performed by: NURSE PRACTITIONER

## 2024-07-12 PROCEDURE — 99204 OFFICE O/P NEW MOD 45 MIN: CPT | Performed by: NURSE PRACTITIONER

## 2024-07-12 PROCEDURE — 85027 COMPLETE CBC AUTOMATED: CPT | Performed by: NURSE PRACTITIONER

## 2024-07-12 PROCEDURE — G2211 COMPLEX E/M VISIT ADD ON: HCPCS | Performed by: NURSE PRACTITIONER

## 2024-07-12 PROCEDURE — 86140 C-REACTIVE PROTEIN: CPT | Performed by: NURSE PRACTITIONER

## 2024-07-12 ASSESSMENT — ENCOUNTER SYMPTOMS: ABDOMINAL PAIN: 1

## 2024-07-12 NOTE — PROGRESS NOTES
"  Assessment & Plan     Perforated diverticulum of large intestine  Would like repeat CT if pain returns or worsening. If severe pain I do recommend patient present to the ED again for evaluation.    Patient has follow up to establish care next week as well as general surgery follow up.   - CT Abdomen Pelvis w Contrast; Future    LLQ abdominal pain  CBC is normal. This makes likelihood of worsening infection less.   ESR is also normal    - CBC with platelets; Future  - CRP inflammation; Future  - ESR: Erythrocyte sedimentation rate; Future  - CBC with platelets  - CRP inflammation  - ESR: Erythrocyte sedimentation rate    Diarrhea, unspecified type  Due to recent antibiotics and hospitalization I will rule out c.diff as cause of diarrhea. I do think it is likely a side effect of antibiotics.   - C. difficile Toxin B PCR with reflex to C. difficile Antigen and Toxins A/B EIA; Future  - C. difficile Toxin B PCR with reflex to C. difficile Antigen and Toxins A/B EIA          Nicotine/Tobacco Cessation  He reports that he has been smoking cigarettes. He has been exposed to tobacco smoke. He has never used smokeless tobacco.  Nicotine/Tobacco Cessation Plan  Information offered: Patient not interested at this time      BMI  Estimated body mass index is 50.9 kg/m  as calculated from the following:    Height as of this encounter: 1.753 m (5' 9\").    Weight as of this encounter: 156.4 kg (344 lb 11.2 oz).             Sana Shaffer is a 24 year old, presenting for the following health issues:  Abdominal Pain (Intermittent left lower quadrant abdominal pain x 3 days. Occurs mostly when moving.)      7/12/2024     2:50 PM   Additional Questions   Roomed by Katelyn     History of Present Illness       Reason for visit:  Abdominal pain  Symptom onset:  1-3 days ago  Symptoms include:  Pain    He eats 0-1 servings of fruits and vegetables daily.He consumes 1 sweetened beverage(s) daily.He exercises with enough effort to " "increase his heart rate 9 or less minutes per day.       Abdominal pain has improved since yesterday.   Patient reports stopped taking multivitamin and this seemed to help decrease pain. Antibiotics will finish tomorrow.    Patient reports currently having diarrhea-2-3 Bms per day -likely as side effect of antibiotic. Did have recent hospitalization as well as IV antibiotics. Was discharged on PO antibiotics.                   Objective    /86 (BP Location: Right arm, Patient Position: Sitting, Cuff Size: Adult Large)   Pulse 114   Temp 98.5  F (36.9  C) (Oral)   Resp 20   Ht 1.753 m (5' 9\")   Wt (!) 156.4 kg (344 lb 11.2 oz)   SpO2 97%   BMI 50.90 kg/m    Body mass index is 50.9 kg/m .  Physical Exam  Constitutional:       Appearance: Normal appearance.   Abdominal:      General: Abdomen is protuberant. Bowel sounds are normal.      Tenderness: There is no abdominal tenderness.   Neurological:      General: No focal deficit present.      Mental Status: He is alert and oriented to person, place, and time.   Psychiatric:         Mood and Affect: Mood normal.         Behavior: Behavior normal.            Results for orders placed or performed in visit on 07/12/24   CBC with platelets     Status: Normal   Result Value Ref Range    WBC Count 7.6 4.0 - 11.0 10e3/uL    RBC Count 5.02 4.40 - 5.90 10e6/uL    Hemoglobin 14.3 13.3 - 17.7 g/dL    Hematocrit 44.6 40.0 - 53.0 %    MCV 89 78 - 100 fL    MCH 28.5 26.5 - 33.0 pg    MCHC 32.1 31.5 - 36.5 g/dL    RDW 13.3 10.0 - 15.0 %    Platelet Count 277 150 - 450 10e3/uL   ESR: Erythrocyte sedimentation rate     Status: Normal   Result Value Ref Range    Erythrocyte Sedimentation Rate 15 0 - 15 mm/hr           Signed Electronically by: LUCIAN PENA CNP    "

## 2024-07-15 ENCOUNTER — OFFICE VISIT (OUTPATIENT)
Dept: INTERNAL MEDICINE | Facility: CLINIC | Age: 24
End: 2024-07-15
Attending: EMERGENCY MEDICINE
Payer: COMMERCIAL

## 2024-07-15 VITALS
HEART RATE: 73 BPM | RESPIRATION RATE: 20 BRPM | SYSTOLIC BLOOD PRESSURE: 120 MMHG | DIASTOLIC BLOOD PRESSURE: 60 MMHG | OXYGEN SATURATION: 98 % | WEIGHT: 315 LBS | BODY MASS INDEX: 46.65 KG/M2 | HEIGHT: 69 IN | TEMPERATURE: 97.6 F

## 2024-07-15 DIAGNOSIS — Z11.59 NEED FOR HEPATITIS C SCREENING TEST: ICD-10-CM

## 2024-07-15 DIAGNOSIS — Z09 FOLLOW-UP EXAM: Primary | ICD-10-CM

## 2024-07-15 DIAGNOSIS — Z11.4 SCREENING FOR HIV (HUMAN IMMUNODEFICIENCY VIRUS): ICD-10-CM

## 2024-07-15 DIAGNOSIS — K57.20 PERFORATED DIVERTICULUM OF LARGE INTESTINE: ICD-10-CM

## 2024-07-15 DIAGNOSIS — R19.7 DIARRHEA, UNSPECIFIED TYPE: ICD-10-CM

## 2024-07-15 LAB
BASOPHILS # BLD AUTO: 0 10E3/UL (ref 0–0.2)
BASOPHILS NFR BLD AUTO: 0 %
C DIFF TOX B STL QL: NEGATIVE
EOSINOPHIL # BLD AUTO: 0.2 10E3/UL (ref 0–0.7)
EOSINOPHIL NFR BLD AUTO: 3 %
ERYTHROCYTE [DISTWIDTH] IN BLOOD BY AUTOMATED COUNT: 13.2 % (ref 10–15)
HCT VFR BLD AUTO: 44.8 % (ref 40–53)
HCV AB SERPL QL IA: NONREACTIVE
HGB BLD-MCNC: 14.3 G/DL (ref 13.3–17.7)
IMM GRANULOCYTES # BLD: 0 10E3/UL
IMM GRANULOCYTES NFR BLD: 0 %
LYMPHOCYTES # BLD AUTO: 2.1 10E3/UL (ref 0.8–5.3)
LYMPHOCYTES NFR BLD AUTO: 32 %
MCH RBC QN AUTO: 28.7 PG (ref 26.5–33)
MCHC RBC AUTO-ENTMCNC: 31.9 G/DL (ref 31.5–36.5)
MCV RBC AUTO: 90 FL (ref 78–100)
MONOCYTES # BLD AUTO: 0.6 10E3/UL (ref 0–1.3)
MONOCYTES NFR BLD AUTO: 10 %
NEUTROPHILS # BLD AUTO: 3.5 10E3/UL (ref 1.6–8.3)
NEUTROPHILS NFR BLD AUTO: 55 %
PLATELET # BLD AUTO: 295 10E3/UL (ref 150–450)
RBC # BLD AUTO: 4.99 10E6/UL (ref 4.4–5.9)
WBC # BLD AUTO: 6.4 10E3/UL (ref 4–11)

## 2024-07-15 PROCEDURE — 86140 C-REACTIVE PROTEIN: CPT | Performed by: NURSE PRACTITIONER

## 2024-07-15 PROCEDURE — 36415 COLL VENOUS BLD VENIPUNCTURE: CPT | Performed by: NURSE PRACTITIONER

## 2024-07-15 PROCEDURE — 87493 C DIFF AMPLIFIED PROBE: CPT | Performed by: NURSE PRACTITIONER

## 2024-07-15 PROCEDURE — 85025 COMPLETE CBC W/AUTO DIFF WBC: CPT | Performed by: NURSE PRACTITIONER

## 2024-07-15 PROCEDURE — 99204 OFFICE O/P NEW MOD 45 MIN: CPT | Performed by: NURSE PRACTITIONER

## 2024-07-15 PROCEDURE — 86803 HEPATITIS C AB TEST: CPT | Performed by: NURSE PRACTITIONER

## 2024-07-15 PROCEDURE — 87389 HIV-1 AG W/HIV-1&-2 AB AG IA: CPT | Performed by: NURSE PRACTITIONER

## 2024-07-15 NOTE — PROGRESS NOTES
"  Assessment & Plan     Follow-up exam  - REVIEW OF HEALTH MAINTENANCE PROTOCOL ORDERS    Perforated diverticulum of large intestine  Symptoms have significantly improved.  States he is feeling better and slowly progressing his diet.  Denies any significant pain at this time.  Recommend he keep his appointment with general surgery to discuss ongoing monitoring of his symptoms.  Will recheck his inflammatory markers today as they were elevated and recheck his CBC to ensure it still within the normal range.  Reviewed progression of his diet and symptoms that if develop would warrant reevaluation at that time.  - CBC with platelets and differential; Future  - CRP, inflammation; Future    Diarrhea, unspecified type  Was seen on 7/12/2024 by another provider for diarrhea likely related to antibiotic use for treatment of his diverticulitis.  He states over the weekend his diarrhea has resolved.  Stool is still soft but no longer watery.  - C. difficile Toxin B PCR with reflex to C. difficile Antigen and Toxins A/B EIA    Screening for HIV (human immunodeficiency virus)    - HIV Antigen Antibody Combo; Future    Need for hepatitis C screening test    - Hepatitis C Screen Reflex to HCV RNA Quant and Genotype; Future        MED REC REQUIRED  Post Medication Reconciliation Status:     BMI  Estimated body mass index is 51.39 kg/m  as calculated from the following:    Height as of this encounter: 1.753 m (5' 9\").    Weight as of this encounter: 157.9 kg (348 lb).         Sana Shaffer is a 24 year old, presenting for the following health issues:  Follow Up (Hospital follow up- diverticulits)      7/15/2024     1:45 PM   Additional Questions   Roomed by Lanette GARRIDO         Hospital Follow-up Visit:    Hospital/Nursing Home/IP Rehab Facility: [facility]  Date of Admission: 6/29/24  Date of Discharge: 7/3/24  Reason(s) for Admission: Diverticulitis  Was the patient in the ICU or did the patient " "experience delirium during hospitalization?  No  Do you have any other stressors you would like to discuss with your provider? No    Problems taking medications regularly:  None  Medication changes since discharge: None  Problems adhering to non-medication therapy:  None    Summary of hospitalization:  [facility] discharge summary reviewed  Diagnostic Tests/Treatments reviewed.  Follow up needed: Labs  Other Healthcare Providers Involved in Patient s Care:         Specialist appointment - General surgery 7/17/24  Update since discharge: improved.         Plan of care communicated with patient             ROS  Comprehensive 12-point review of systems was completed and negative except as noted in HPI.        Objective    /60 (BP Location: Right arm, Patient Position: Sitting)   Pulse 73   Temp 97.6  F (36.4  C)   Resp 20   Ht 1.753 m (5' 9\")   Wt (!) 157.9 kg (348 lb)   SpO2 98%   BMI 51.39 kg/m    Body mass index is 51.39 kg/m .  Physical Exam   General: Alert and oriented.  No acute distress.  Does not appear acutely ill.  Able to mount exam table without difficulty.  Cardiovascular: Regular rate and rhythm.  Respiratory: Respirations are easy and regular.  Lung sounds clear throughout on auscultation.  Abdominal: Abdomen is soft, flat.  Normal bowel sounds.  No guarding slight tenderness with palpation of the left lower quadrant otherwise nontender throughout.  No organomegaly or masses.  Negative for CVA tenderness.  Neurological: Normal gait.  Alert.  Psychological: Appropriate mood and affect.  Cooperative.          Signed Electronically by: Abby Riddle NP    "

## 2024-07-15 NOTE — PATIENT INSTRUCTIONS
Keep follow unit(s) with general surgery.     Rechecked labs today.     Can use benadryl for a few nights to help with ear issues.

## 2024-07-16 LAB
CRP SERPL-MCNC: 8.22 MG/L
HIV 1+2 AB+HIV1 P24 AG SERPL QL IA: NONREACTIVE

## 2024-07-16 NOTE — PROGRESS NOTES
GENERAL SURGICAL CONSULTATION    I was requested by No Ref-Primary, Physician to consult on this pt to evaluate them for a contained perforated diverticulitis    HPI:  This is a 24 year old male here today after being admitted to [facility] on 6/29/2024 with a contained perforated diverticulitis.  He was initially treated with IV antibiotics and bowel rest.  As things progressed he was discharged from the hospital on oral antibiotics   He completed his oral ABX on July 13th.  Since being home he has done well. He has noticed that he has had some left flank pain that started yesterday afternoon.  He has been having 2-3 BM's a day and they remain soft.  They are getting more firm recently.      Allergies:Adhesive tape    Past medical history:  Super morbid obesity    No past surgical history on file.    CURRENT MEDS:  Current Outpatient Medications   Medication Sig Dispense Refill    ibuprofen (ADVIL/MOTRIN) 200 MG capsule Take 400-600 mg by mouth every 8 hours as needed for moderate pain         No family history on file.  Family history is not pertinent to this patients Chief Complaint.     reports that he has been smoking cigarettes. He has been exposed to tobacco smoke. He has never used smokeless tobacco.    Review of Systems -   10 point Review of systems is negative except for; as mentioned above in HPI and PMHx    Vitals: /70 (BP Location: Right arm)   Wt (!) 155.1 kg (342 lb)   BMI 50.50 kg/m    BMI= Body mass index is 50.5 kg/m .     EXAM:  GENERAL: Well developed male  HEENT: EOMI, Anicteric Sclera, Moist Mucous Membranes,  In Mouth the pt does not have redness or bleeding gums  CARDIOVASCULAR: RRR w/out murmur   CHEST/LUNG: Clear to Auscultation  ABDOMEN:  Non tender to palpation, +BS  MUSCULOSKELETAL:  No deformities with good range of motion in all extremities  NEURO: He is ambulatory with good strength in both legs.  HEME/LYMPH: No Cervical Adenopathy or tenderness.     IMAGES:  No new  studies    Assessment/Plan:  24 yr old man with a recent contained perforated diverticulitis.      We discussed his Obesity (BMI 50 ) and I expressed my concerns about the health issues that will be down the road if he does not get his weight in a healthier place.  He would like to see our Weight Management Team for an assessment.        Abe Jimenez MD  [facility]  [phone]

## 2024-07-17 ENCOUNTER — OFFICE VISIT (OUTPATIENT)
Dept: SURGERY | Facility: CLINIC | Age: 24
End: 2024-07-17
Payer: COMMERCIAL

## 2024-07-17 VITALS — DIASTOLIC BLOOD PRESSURE: 70 MMHG | WEIGHT: 315 LBS | SYSTOLIC BLOOD PRESSURE: 122 MMHG | BODY MASS INDEX: 50.5 KG/M2

## 2024-07-17 DIAGNOSIS — K57.20 PERFORATED DIVERTICULUM OF LARGE INTESTINE: Primary | ICD-10-CM

## 2024-07-17 DIAGNOSIS — E66.01 MORBID OBESITY (H): ICD-10-CM

## 2024-07-17 PROCEDURE — 99213 OFFICE O/P EST LOW 20 MIN: CPT | Performed by: SURGERY

## 2024-07-17 NOTE — LETTER
7/17/2024      Edmund Vieira  [address] [facility] [address]      Dear Colleague,    Thank you for referring your patient, Edmund Vieira, to the [facility]. Please see a copy of my visit note below.    GENERAL SURGICAL CONSULTATION    I was requested by No Ref-Primary, Physician to consult on this pt to evaluate them for a contained perforated diverticulitis    HPI:  This is a 24 year old male here today after being admitted to [facility] on 6/29/2024 with a contained perforated diverticulitis.  He was initially treated with IV antibiotics and bowel rest.  As things progressed he was discharged from the hospital on oral antibiotics   He completed his oral ABX on July 13th.  Since being home he has done well. He has noticed that he has had some left flank pain that started yesterday afternoon.  He has been having 2-3 BM's a day and they remain soft.  They are getting more firm recently.      Allergies:Adhesive tape    Past medical history:  Super morbid obesity    No past surgical history on file.    CURRENT MEDS:  Current Outpatient Medications   Medication Sig Dispense Refill     ibuprofen (ADVIL/MOTRIN) 200 MG capsule Take 400-600 mg by mouth every 8 hours as needed for moderate pain         No family history on file.  Family history is not pertinent to this patients Chief Complaint.     reports that he has been smoking cigarettes. He has been exposed to tobacco smoke. He has never used smokeless tobacco.    Review of Systems -   10 point Review of systems is negative except for; as mentioned above in HPI and PMHx    Vitals: /70 (BP Location: Right arm)   Wt (!) 155.1 kg (342 lb)   BMI 50.50 kg/m    BMI= Body mass index is 50.5 kg/m .     EXAM:  GENERAL: Well developed male  HEENT: EOMI, Anicteric Sclera, Moist Mucous Membranes,  In Mouth the pt does not have redness or bleeding gums  CARDIOVASCULAR: RRR w/out murmur   CHEST/LUNG: Clear to  Auscultation  ABDOMEN:  Non tender to palpation, +BS  MUSCULOSKELETAL:  No deformities with good range of motion in all extremities  NEURO: He is ambulatory with good strength in both legs.  HEME/LYMPH: No Cervical Adenopathy or tenderness.     IMAGES:  No new studies    Assessment/Plan:  24 yr old man with a recent contained perforated diverticulitis.      We discussed his Obesity (BMI 50 ) and I expressed my concerns about the health issues that will be down the road if he does not get his weight in a healthier place.  He would like to see our Weight Management Team for an assessment.        Abe Jimenez MD  [facility]  [phone]       Again, thank you for allowing me to participate in the care of your patient.        Sincerely,        Abe Jimenez MD

## 2024-07-28 ENCOUNTER — HEALTH MAINTENANCE LETTER (OUTPATIENT)
Age: 24
End: 2024-07-28

## 2024-11-27 ENCOUNTER — OFFICE VISIT (OUTPATIENT)
Dept: FAMILY MEDICINE | Facility: CLINIC | Age: 24
End: 2024-11-27
Payer: COMMERCIAL

## 2024-11-27 VITALS
HEIGHT: 69 IN | HEART RATE: 77 BPM | BODY MASS INDEX: 46.65 KG/M2 | TEMPERATURE: 98 F | OXYGEN SATURATION: 97 % | SYSTOLIC BLOOD PRESSURE: 102 MMHG | DIASTOLIC BLOOD PRESSURE: 68 MMHG | RESPIRATION RATE: 18 BRPM | WEIGHT: 315 LBS

## 2024-11-27 DIAGNOSIS — R10.32 LLQ ABDOMINAL PAIN: Primary | ICD-10-CM

## 2024-11-27 LAB
BASOPHILS # BLD AUTO: 0 10E3/UL (ref 0–0.2)
BASOPHILS NFR BLD AUTO: 1 %
CRP SERPL-MCNC: 8.95 MG/L
EOSINOPHIL # BLD AUTO: 0.2 10E3/UL (ref 0–0.7)
EOSINOPHIL NFR BLD AUTO: 2 %
ERYTHROCYTE [DISTWIDTH] IN BLOOD BY AUTOMATED COUNT: 12.6 % (ref 10–15)
HCT VFR BLD AUTO: 44.2 % (ref 40–53)
HGB BLD-MCNC: 14.3 G/DL (ref 13.3–17.7)
IMM GRANULOCYTES # BLD: 0 10E3/UL
IMM GRANULOCYTES NFR BLD: 0 %
LYMPHOCYTES # BLD AUTO: 2.1 10E3/UL (ref 0.8–5.3)
LYMPHOCYTES NFR BLD AUTO: 31 %
MCH RBC QN AUTO: 29.5 PG (ref 26.5–33)
MCHC RBC AUTO-ENTMCNC: 32.4 G/DL (ref 31.5–36.5)
MCV RBC AUTO: 91 FL (ref 78–100)
MONOCYTES # BLD AUTO: 0.5 10E3/UL (ref 0–1.3)
MONOCYTES NFR BLD AUTO: 7 %
NEUTROPHILS # BLD AUTO: 4.1 10E3/UL (ref 1.6–8.3)
NEUTROPHILS NFR BLD AUTO: 59 %
PLATELET # BLD AUTO: 239 10E3/UL (ref 150–450)
RBC # BLD AUTO: 4.85 10E6/UL (ref 4.4–5.9)
WBC # BLD AUTO: 6.9 10E3/UL (ref 4–11)

## 2024-11-27 PROCEDURE — 85025 COMPLETE CBC W/AUTO DIFF WBC: CPT | Performed by: NURSE PRACTITIONER

## 2024-11-27 PROCEDURE — 86140 C-REACTIVE PROTEIN: CPT | Performed by: NURSE PRACTITIONER

## 2024-11-27 PROCEDURE — 99214 OFFICE O/P EST MOD 30 MIN: CPT | Performed by: NURSE PRACTITIONER

## 2024-11-27 PROCEDURE — 36415 COLL VENOUS BLD VENIPUNCTURE: CPT | Performed by: NURSE PRACTITIONER

## 2024-11-27 RX ORDER — CHOLECALCIFEROL (VITAMIN D3) 125 MCG
3000 CAPSULE ORAL
COMMUNITY
Start: 2024-11-27

## 2024-11-27 NOTE — PROGRESS NOTES
"Assessment & Plan     LLQ abdominal pain  Symptoms are consistent with lactose intolerance. With patient's history of perforation from diverticulitis I did order CRP and CBC to assess further. These were overall normal -slighly elevated CRP but not significant.    Discussed with patient to focus on diet at this time and foods that seem to make symptoms worse-discussed with patientif worsening symptoms I do want patient to obtain CT scan-Ct was ordered in [facility] and not completed so should be active order. Discussed if develops fever or severe abdominal pain needs to be evaluated in ER.    - lactase (LACTAID) 3000 UNIT tablet; Take 1 tablet (3,000 Units) by mouth 3 times daily (with meals).  - CRP inflammation; Future  - CBC with platelets and differential; Future  - CRP inflammation  - CBC with platelets and differential          BMI  Estimated body mass index is 50.69 kg/m  as calculated from the following:    Height as of this encounter: 1.753 m (5' 9.02\").    Weight as of this encounter: 155.8 kg (343 lb 6.4 oz).             Sana Shaffer is a 24 year old, presenting for the following health issues:  Abdominal Pain (Sx similar to previous diverticulitis sx starting about x1 wk ago. Sx include bloating, nausea, and abd pain which is mostly occurring when he is eating dairy. Denied diarrhea.)        11/27/2024     8:27 AM   Additional Questions   Roomed by RADHA Hussein     History of Present Illness       Reason for visit:  To disuss returning symptoms  Symptom onset:  3-7 days ago  Symptoms include:  Bloating, loss of appetite, abdominal pain  Symptom intensity:  Mild  Symptom progression:  Improving  Had these symptoms before:  Yes  Has tried/received treatment for these symptoms:  Yes  Previous treatment was successful:  Yes  Prior treatment description:  When I got my treatment for diverticulitis it worked  What makes it worse:  I've narrowed it down to dairy   He is taking medications regularly.       Patient " "has tried lower amount of dairy and symptoms are improved.    Pain on and off with lactose and sometimes greasy food-worse with standing. Gets better  with sitting.    LLQ pain-no diarrhea                    Objective    /68 (BP Location: Right arm, Patient Position: Sitting, Cuff Size: Adult Large)   Pulse 77   Temp 98  F (36.7  C) (Oral)   Resp 18   Ht 1.753 m (5' 9.02\")   Wt (!) 155.8 kg (343 lb 6.4 oz)   SpO2 97%   BMI 50.69 kg/m    Body mass index is 50.69 kg/m .  Physical Exam  Constitutional:       Appearance: Normal appearance.   Abdominal:      General: Abdomen is flat. There is no distension.      Palpations: There is no mass.      Tenderness: There is abdominal tenderness (generalized).   Neurological:      General: No focal deficit present.      Mental Status: He is alert and oriented to person, place, and time.   Psychiatric:         Mood and Affect: Mood normal.         Behavior: Behavior normal.            Results for orders placed or performed in visit on 11/27/24   CRP inflammation     Status: Abnormal   Result Value Ref Range    CRP Inflammation 8.95 (H) <5.00 mg/L   CBC with platelets and differential     Status: None   Result Value Ref Range    WBC Count 6.9 4.0 - 11.0 10e3/uL    RBC Count 4.85 4.40 - 5.90 10e6/uL    Hemoglobin 14.3 13.3 - 17.7 g/dL    Hematocrit 44.2 40.0 - 53.0 %    MCV 91 78 - 100 fL    MCH 29.5 26.5 - 33.0 pg    MCHC 32.4 31.5 - 36.5 g/dL    RDW 12.6 10.0 - 15.0 %    Platelet Count 239 150 - 450 10e3/uL    % Neutrophils 59 %    % Lymphocytes 31 %    % Monocytes 7 %    % Eosinophils 2 %    % Basophils 1 %    % Immature Granulocytes 0 %    Absolute Neutrophils 4.1 1.6 - 8.3 10e3/uL    Absolute Lymphocytes 2.1 0.8 - 5.3 10e3/uL    Absolute Monocytes 0.5 0.0 - 1.3 10e3/uL    Absolute Eosinophils 0.2 0.0 - 0.7 10e3/uL    Absolute Basophils 0.0 0.0 - 0.2 10e3/uL    Absolute Immature Granulocytes 0.0 <=0.4 10e3/uL   CBC with platelets and differential     Status: None "    Narrative    The following orders were created for panel order CBC with platelets and differential.  Procedure                               Abnormality         Status                     ---------                               -----------         ------                     CBC with platelets and d...[[id number]]                      Final result                 Please view results for these tests on the individual orders.           Signed Electronically by: LUCIAN PENA CNP

## 2025-08-10 ENCOUNTER — HEALTH MAINTENANCE LETTER (OUTPATIENT)
Age: 25
End: 2025-08-10